# Patient Record
Sex: MALE | Race: WHITE | NOT HISPANIC OR LATINO | Employment: OTHER | URBAN - METROPOLITAN AREA
[De-identification: names, ages, dates, MRNs, and addresses within clinical notes are randomized per-mention and may not be internally consistent; named-entity substitution may affect disease eponyms.]

---

## 2018-02-01 ENCOUNTER — TELEPHONE (OUTPATIENT)
Dept: AUDIOLOGY | Facility: CLINIC | Age: 83
End: 2018-02-01

## 2018-02-01 NOTE — TELEPHONE ENCOUNTER
----- Message from Julianne Poe MA sent at 2/1/2018  2:38 PM CST -----  Contact: Pt's Wife Mrs Belcher [phone]      ----- Message -----  From: Sarah Skaggs  Sent: 2/1/2018  12:11 PM  To: Margaux DELANEY Staff    Pt's wife is requesting a call back from the nurse to schedule an appointment as a new patient.  Mrs Belcher reports that her  has a cochlear implant but is eligible for a new device but they need to establish with the provider here as they just moved from [place].    Pt has all the records from the previous surgery in [place].    Mrs Belcher may be reached at [phone].    Thank you.  LC

## 2018-03-05 ENCOUNTER — CLINICAL SUPPORT (OUTPATIENT)
Dept: AUDIOLOGY | Facility: CLINIC | Age: 83
End: 2018-03-05

## 2018-03-05 ENCOUNTER — CLINICAL SUPPORT (OUTPATIENT)
Dept: AUDIOLOGY | Facility: CLINIC | Age: 83
End: 2018-03-05
Payer: MEDICARE

## 2018-03-05 ENCOUNTER — OFFICE VISIT (OUTPATIENT)
Dept: OTOLARYNGOLOGY | Facility: CLINIC | Age: 83
End: 2018-03-05
Payer: MEDICARE

## 2018-03-05 VITALS — HEIGHT: 69 IN | WEIGHT: 185.88 LBS | BODY MASS INDEX: 27.53 KG/M2

## 2018-03-05 DIAGNOSIS — H91.8X3 ASYMMETRICAL HEARING LOSS, UNSPECIFIED LATERALITY: Primary | ICD-10-CM

## 2018-03-05 DIAGNOSIS — H90.A31 MIXED CONDUCTIVE AND SENSORINEURAL HEARING LOSS OF RIGHT EAR WITH RESTRICTED HEARING OF LEFT EAR: Primary | ICD-10-CM

## 2018-03-05 DIAGNOSIS — H71.92 CHOLESTEATOMA OF LEFT EAR: ICD-10-CM

## 2018-03-05 PROCEDURE — 92557 COMPREHENSIVE HEARING TEST: CPT | Mod: PBBFAC | Performed by: AUDIOLOGIST

## 2018-03-05 PROCEDURE — 99204 OFFICE O/P NEW MOD 45 MIN: CPT | Mod: 25,S$PBB,, | Performed by: OTOLARYNGOLOGY

## 2018-03-05 PROCEDURE — 99499 UNLISTED E&M SERVICE: CPT | Mod: S$GLB,,, | Performed by: OTOLARYNGOLOGY

## 2018-03-05 PROCEDURE — 69220 CLEAN OUT MASTOID CAVITY: CPT | Mod: PBBFAC | Performed by: OTOLARYNGOLOGY

## 2018-03-05 PROCEDURE — 99213 OFFICE O/P EST LOW 20 MIN: CPT | Mod: PBBFAC | Performed by: OTOLARYNGOLOGY

## 2018-03-05 PROCEDURE — 69220 CLEAN OUT MASTOID CAVITY: CPT | Mod: S$PBB,50,, | Performed by: OTOLARYNGOLOGY

## 2018-03-05 PROCEDURE — 99999 PR PBB SHADOW E&M-EST. PATIENT-LVL III: CPT | Mod: PBBFAC,,, | Performed by: OTOLARYNGOLOGY

## 2018-03-05 RX ORDER — WARFARIN 2 MG/1
2 TABLET ORAL DAILY
Status: ON HOLD | COMMUNITY
End: 2018-03-23 | Stop reason: HOSPADM

## 2018-03-05 RX ORDER — SIMVASTATIN 40 MG/1
40 TABLET, FILM COATED ORAL NIGHTLY
COMMUNITY

## 2018-03-05 RX ORDER — SITAGLIPTIN 25 MG/1
TABLET, FILM COATED ORAL
COMMUNITY
Start: 2018-02-12

## 2018-03-05 RX ORDER — GABAPENTIN 300 MG/1
CAPSULE ORAL
COMMUNITY
Start: 2018-02-24

## 2018-03-05 RX ORDER — LISINOPRIL 40 MG/1
TABLET ORAL
COMMUNITY
Start: 2018-01-09

## 2018-03-05 RX ORDER — LANOLIN ALCOHOL/MO/W.PET/CERES
1 CREAM (GRAM) TOPICAL 2 TIMES DAILY
COMMUNITY

## 2018-03-05 RX ORDER — MONTELUKAST SODIUM 10 MG/1
10 TABLET ORAL NIGHTLY
COMMUNITY

## 2018-03-05 RX ORDER — SILDENAFIL CITRATE 20 MG/1
TABLET ORAL
COMMUNITY
Start: 2018-01-09

## 2018-03-05 RX ORDER — POTASSIUM CHLORIDE 750 MG/1
10 TABLET, EXTENDED RELEASE ORAL 2 TIMES DAILY
COMMUNITY

## 2018-03-05 RX ORDER — ATENOLOL 25 MG/1
25 TABLET ORAL DAILY
COMMUNITY

## 2018-03-05 RX ORDER — AMLODIPINE BESYLATE 5 MG/1
TABLET ORAL
COMMUNITY
Start: 2018-01-09

## 2018-03-05 RX ORDER — TORSEMIDE 20 MG/1
20 TABLET ORAL DAILY
COMMUNITY

## 2018-03-05 RX ORDER — EZETIMIBE 10 MG/1
TABLET ORAL
COMMUNITY
Start: 2018-01-22

## 2018-03-05 RX ORDER — ROPINIROLE 0.5 MG/1
TABLET, FILM COATED ORAL
COMMUNITY
Start: 2018-02-05

## 2018-03-05 NOTE — PROGRESS NOTES
I dispensed Mr. Belcher's new left Baha 5 power sound processor.  I paired the TV streamer to the Baha and demonstrated its application.    He had an audiogram and saw Dr. Damico to establish care since they just moved from [place].    He will call if they have any problems.

## 2018-03-05 NOTE — PROGRESS NOTES
Subjective:       Patient ID: Luis Belcher is a 87 y.o. male.    Chief Complaint: B HL, L Cav and BAHA.    HPI: Hx of above.    Has L BAHA for ?? 6 yrs.    Needs F/U L Cav too.    Past Medical History: Patient has a past medical history of Arthritis; Cancer; Diabetes mellitus; Heart disease; and Hypertension.    Past Surgical History: Patient has no past surgical history on file.    Social History: Patient reports that he has quit smoking. He has never used smokeless tobacco.    Family History: family history is not on file.    Medications:   Current Outpatient Prescriptions   Medication Sig    amLODIPine (NORVASC) 5 MG tablet     atenolol (TENORMIN) 25 MG tablet Take 25 mg by mouth once daily.    calcium citrate-vitamin D3 315-200 mg (CITRACAL+D) 315-200 mg-unit per tablet Take 1 tablet by mouth 2 (two) times daily.    ezetimibe (ZETIA) 10 mg tablet     gabapentin (NEURONTIN) 300 MG capsule     JANUVIA 25 mg Tab     lisinopril (PRINIVIL,ZESTRIL) 40 MG tablet     montelukast (SINGULAIR) 10 mg tablet Take 10 mg by mouth every evening.    potassium chloride SA (K-DUR,KLOR-CON) 10 MEQ tablet Take 10 mEq by mouth 2 (two) times daily.    rOPINIRole (REQUIP) 0.5 MG tablet     sildenafil, antihypertensive, (REVATIO) 20 mg Tab     simvastatin (ZOCOR) 40 MG tablet Take 40 mg by mouth every evening.    torsemide (DEMADEX) 20 MG Tab Take 20 mg by mouth once daily.    UBIQUINONE ORAL Take by mouth.    warfarin (COUMADIN) 2 MG tablet Take 2 mg by mouth once daily.     No current facility-administered medications for this visit.        Allergies: Patient has No Known Allergies.      Review of Systems   Constitutional: Negative for activity change, appetite change, chills, diaphoresis, fatigue, fever and unexpected weight change.   HENT: Positive for ear discharge, ear pain and hearing loss. Negative for congestion, facial swelling, nosebleeds, postnasal drip, rhinorrhea, sinus pressure, sneezing, sore throat,  tinnitus, trouble swallowing and voice change.    Eyes: Negative for photophobia, pain, discharge, redness and visual disturbance.   Respiratory: Negative for cough, chest tightness, shortness of breath and wheezing.    Cardiovascular: Negative for chest pain and palpitations.   Gastrointestinal: Negative for abdominal pain, constipation, diarrhea and nausea.   Genitourinary: Negative for dysuria and frequency.   Musculoskeletal: Negative for arthralgias, back pain, gait problem, joint swelling, myalgias, neck pain and neck stiffness.   Skin: Negative for color change, pallor and rash.   Neurological: Negative for dizziness, tremors, seizures, syncope, facial asymmetry, speech difficulty, weakness, light-headedness, numbness and headaches.   Hematological: Negative for adenopathy. Does not bruise/bleed easily.   Psychiatric/Behavioral: Negative for agitation, confusion, decreased concentration, dysphoric mood and sleep disturbance. The patient is not nervous/anxious and is not hyperactive.        Objective:      Physical Exam   Constitutional: He is oriented to person, place, and time. He appears well-developed and well-nourished. He is cooperative.  Non-toxic appearance. He does not have a sickly appearance. He does not appear ill. No distress.   HENT:   Head: Normocephalic and atraumatic. Not macrocephalic and not microcephalic. Head is without raccoon's eyes, without Ureña's sign, without abrasion, without contusion, without laceration, without right periorbital erythema and without left periorbital erythema. Hair is normal.       Right Ear: Ear canal normal. No lacerations. No drainage, swelling or tenderness. No foreign bodies. No mastoid tenderness. Tympanic membrane is not injected, not scarred, not perforated, not erythematous, not retracted and not bulging. Tympanic membrane mobility is normal. No middle ear effusion. No hemotympanum. Decreased hearing is noted.   Left Ear: Ear canal normal. No  lacerations. No drainage, swelling or tenderness. No foreign bodies. No mastoid tenderness. Tympanic membrane is not injected, not scarred, not perforated, not erythematous, not retracted and not bulging. Tympanic membrane mobility is normal.  No middle ear effusion. No hemotympanum. Decreased hearing is noted.   Ears:    Nose: No mucosal edema, rhinorrhea, nose lacerations, sinus tenderness, nasal deformity, septal deviation or nasal septal hematoma. No epistaxis.  No foreign bodies. Right sinus exhibits no maxillary sinus tenderness. Left sinus exhibits no maxillary sinus tenderness.       Patient was brought to the minor procedure room and first the right then the left ear cavity was cleaned with microdissection techniques. There was no evidence of residual or recurrent cholesteatoma. Patient tolerated the procedure well.    Eyes: Conjunctivae, EOM and lids are normal. Pupils are equal, round, and reactive to light.   Neck: Normal range of motion. Neck supple. No JVD present. No tracheal tenderness and no muscular tenderness present. No neck rigidity. No tracheal deviation, no edema, no erythema and normal range of motion present. No thyroid mass and no thyromegaly present.   Cardiovascular: Normal rate and regular rhythm.    Pulmonary/Chest: Effort normal. No accessory muscle usage or stridor. No apnea, no tachypnea and no bradypnea. No respiratory distress.   Abdominal: Soft. Normal appearance.   Musculoskeletal: Normal range of motion.   Lymphadenopathy:        Head (right side): No submental, no submandibular, no tonsillar, no preauricular and no posterior auricular adenopathy present.        Head (left side): No submental, no submandibular, no tonsillar, no preauricular and no posterior auricular adenopathy present.     He has no cervical adenopathy.        Right cervical: No superficial cervical, no deep cervical and no posterior cervical adenopathy present.       Left cervical: No superficial cervical, no  deep cervical and no posterior cervical adenopathy present.   Neurological: He is alert and oriented to person, place, and time. He displays no atrophy and no tremor. No cranial nerve deficit or sensory deficit. He exhibits normal muscle tone. He displays no seizure activity.   Skin: Skin is warm, dry and intact. No abrasion, no bruising, no burn, no ecchymosis, no laceration, no lesion and no rash noted. He is not diaphoretic. No erythema. No pallor.   Psychiatric: He has a normal mood and affect. His behavior is normal. Judgment and thought content normal. His speech is not rapid and/or pressured and not slurred. Cognition and memory are normal. He is communicative.   Nursing note and vitals reviewed.              Assessment:       1. Asymmetrical hearing loss, unspecified laterality    2. Cholesteatoma of left ear        Plan:         RTC 6 mos.

## 2018-03-20 ENCOUNTER — HOSPITAL ENCOUNTER (INPATIENT)
Facility: HOSPITAL | Age: 83
LOS: 4 days | Discharge: HOME HEALTH | DRG: 314 | End: 2018-03-24
Attending: EMERGENCY MEDICINE | Admitting: EMERGENCY MEDICINE
Payer: MEDICARE

## 2018-03-20 DIAGNOSIS — T82.7XXD INFECTED AORTIC GRAFT, SUBSEQUENT ENCOUNTER: ICD-10-CM

## 2018-03-20 DIAGNOSIS — I77.6 AORTITIS: ICD-10-CM

## 2018-03-20 DIAGNOSIS — A41.9 SEPSIS, DUE TO UNSPECIFIED ORGANISM: ICD-10-CM

## 2018-03-20 DIAGNOSIS — I34.0 NONRHEUMATIC MITRAL VALVE INSUFFICIENCY: ICD-10-CM

## 2018-03-20 DIAGNOSIS — I71.40 ABDOMINAL AORTIC ANEURYSM (AAA) WITHOUT RUPTURE: ICD-10-CM

## 2018-03-20 DIAGNOSIS — A41.9 SEPSIS: Primary | ICD-10-CM

## 2018-03-20 DIAGNOSIS — I50.9 CHF (CONGESTIVE HEART FAILURE): ICD-10-CM

## 2018-03-20 DIAGNOSIS — I10 HYPERTENSION: ICD-10-CM

## 2018-03-20 PROBLEM — E11.29 TYPE 2 DIABETES MELLITUS WITH RENAL COMPLICATION: Status: ACTIVE | Noted: 2018-03-20

## 2018-03-20 PROBLEM — N18.30 STAGE 3 CHRONIC KIDNEY DISEASE: Status: ACTIVE | Noted: 2018-03-20

## 2018-03-20 PROBLEM — I48.0 PAROXYSMAL ATRIAL FIBRILLATION: Status: ACTIVE | Noted: 2018-03-20

## 2018-03-20 PROBLEM — D64.9 ANEMIA: Status: ACTIVE | Noted: 2018-03-20

## 2018-03-20 PROBLEM — C61 PROSTATE CANCER: Status: ACTIVE | Noted: 2018-03-20

## 2018-03-20 LAB
ALBUMIN SERPL BCP-MCNC: 3.1 G/DL
ALLENS TEST: ABNORMAL
ALLENS TEST: NORMAL
ALP SERPL-CCNC: 105 U/L
ALT SERPL W/O P-5'-P-CCNC: 14 U/L
ANION GAP SERPL CALC-SCNC: 14 MMOL/L
AST SERPL-CCNC: 18 U/L
BASOPHILS # BLD AUTO: 0.02 K/UL
BASOPHILS NFR BLD: 0.2 %
BILIRUB SERPL-MCNC: 0.8 MG/DL
BILIRUB UR QL STRIP: NEGATIVE
BUN SERPL-MCNC: 35 MG/DL
CALCIUM SERPL-MCNC: 9 MG/DL
CHLORIDE SERPL-SCNC: 98 MMOL/L
CLARITY UR REFRACT.AUTO: CLEAR
CO2 SERPL-SCNC: 28 MMOL/L
COLOR UR AUTO: YELLOW
CREAT SERPL-MCNC: 1.7 MG/DL
CRP SERPL-MCNC: 54.88 MG/L
DIFFERENTIAL METHOD: ABNORMAL
EOSINOPHIL # BLD AUTO: 0.1 K/UL
EOSINOPHIL NFR BLD: 0.4 %
ERYTHROCYTE [DISTWIDTH] IN BLOOD BY AUTOMATED COUNT: 15.2 %
ERYTHROCYTE [SEDIMENTATION RATE] IN BLOOD BY WESTERGREN METHOD: 80 MM/HR
EST. GFR  (AFRICAN AMERICAN): 41 ML/MIN/1.73 M^2
EST. GFR  (NON AFRICAN AMERICAN): 35.5 ML/MIN/1.73 M^2
GLUCOSE SERPL-MCNC: 236 MG/DL
GLUCOSE UR QL STRIP: NEGATIVE
HCO3 UR-SCNC: 32.7 MMOL/L (ref 24–28)
HCT VFR BLD AUTO: 29.2 %
HGB BLD-MCNC: 9.2 G/DL
HGB UR QL STRIP: NEGATIVE
IMM GRANULOCYTES # BLD AUTO: 0.1 K/UL
IMM GRANULOCYTES NFR BLD AUTO: 0.8 %
KETONES UR QL STRIP: NEGATIVE
LACTATE SERPL-SCNC: 1.4 MMOL/L
LACTATE SERPL-SCNC: 2.4 MMOL/L
LDH SERPL L TO P-CCNC: 1.17 MMOL/L (ref 0.5–2.2)
LDH SERPL L TO P-CCNC: 1.39 MMOL/L (ref 0.5–2.2)
LEUKOCYTE ESTERASE UR QL STRIP: NEGATIVE
LIPASE SERPL-CCNC: 35 U/L
LYMPHOCYTES # BLD AUTO: 0.8 K/UL
LYMPHOCYTES NFR BLD: 5.8 %
MCH RBC QN AUTO: 28.2 PG
MCHC RBC AUTO-ENTMCNC: 31.5 G/DL
MCV RBC AUTO: 90 FL
MONOCYTES # BLD AUTO: 0.5 K/UL
MONOCYTES NFR BLD: 3.7 %
NEUTROPHILS # BLD AUTO: 11.6 K/UL
NEUTROPHILS NFR BLD: 89.1 %
NITRITE UR QL STRIP: NEGATIVE
NRBC BLD-RTO: 0 /100 WBC
PCO2 BLDA: 44.5 MMHG (ref 35–45)
PH SMN: 7.47 [PH] (ref 7.35–7.45)
PH UR STRIP: 5 [PH] (ref 5–8)
PLATELET # BLD AUTO: 223 K/UL
PMV BLD AUTO: 10.7 FL
PO2 BLDA: 44 MMHG (ref 40–60)
POC BE: 9 MMOL/L
POC SATURATED O2: 83 % (ref 95–100)
POC TCO2: 34 MMOL/L (ref 24–29)
POTASSIUM SERPL-SCNC: 3.6 MMOL/L
PROT SERPL-MCNC: 7.5 G/DL
PROT UR QL STRIP: NEGATIVE
RBC # BLD AUTO: 3.26 M/UL
SAMPLE: ABNORMAL
SAMPLE: NORMAL
SITE: ABNORMAL
SITE: NORMAL
SODIUM SERPL-SCNC: 140 MMOL/L
SP GR UR STRIP: 1.01 (ref 1–1.03)
URN SPEC COLLECT METH UR: NORMAL
UROBILINOGEN UR STRIP-ACNC: NEGATIVE EU/DL
WBC # BLD AUTO: 13 K/UL

## 2018-03-20 PROCEDURE — 83605 ASSAY OF LACTIC ACID: CPT

## 2018-03-20 PROCEDURE — 99900035 HC TECH TIME PER 15 MIN (STAT)

## 2018-03-20 PROCEDURE — 86141 C-REACTIVE PROTEIN HS: CPT

## 2018-03-20 PROCEDURE — 96367 TX/PROPH/DG ADDL SEQ IV INF: CPT

## 2018-03-20 PROCEDURE — 25000003 PHARM REV CODE 250: Performed by: STUDENT IN AN ORGANIZED HEALTH CARE EDUCATION/TRAINING PROGRAM

## 2018-03-20 PROCEDURE — 85025 COMPLETE CBC W/AUTO DIFF WBC: CPT

## 2018-03-20 PROCEDURE — 96365 THER/PROPH/DIAG IV INF INIT: CPT

## 2018-03-20 PROCEDURE — 82962 GLUCOSE BLOOD TEST: CPT

## 2018-03-20 PROCEDURE — 63600175 PHARM REV CODE 636 W HCPCS: Performed by: STUDENT IN AN ORGANIZED HEALTH CARE EDUCATION/TRAINING PROGRAM

## 2018-03-20 PROCEDURE — 82803 BLOOD GASES ANY COMBINATION: CPT

## 2018-03-20 PROCEDURE — 96366 THER/PROPH/DIAG IV INF ADDON: CPT

## 2018-03-20 PROCEDURE — 80053 COMPREHEN METABOLIC PANEL: CPT

## 2018-03-20 PROCEDURE — 85651 RBC SED RATE NONAUTOMATED: CPT

## 2018-03-20 PROCEDURE — 93005 ELECTROCARDIOGRAM TRACING: CPT

## 2018-03-20 PROCEDURE — 99284 EMERGENCY DEPT VISIT MOD MDM: CPT | Mod: ,,, | Performed by: EMERGENCY MEDICINE

## 2018-03-20 PROCEDURE — 87040 BLOOD CULTURE FOR BACTERIA: CPT

## 2018-03-20 PROCEDURE — 81003 URINALYSIS AUTO W/O SCOPE: CPT

## 2018-03-20 PROCEDURE — 99285 EMERGENCY DEPT VISIT HI MDM: CPT | Mod: 25

## 2018-03-20 PROCEDURE — 93010 ELECTROCARDIOGRAM REPORT: CPT | Mod: ,,, | Performed by: INTERNAL MEDICINE

## 2018-03-20 PROCEDURE — 83690 ASSAY OF LIPASE: CPT

## 2018-03-20 PROCEDURE — 12000002 HC ACUTE/MED SURGE SEMI-PRIVATE ROOM

## 2018-03-20 RX ORDER — IBUPROFEN 200 MG
16 TABLET ORAL
Status: DISCONTINUED | OUTPATIENT
Start: 2018-03-21 | End: 2018-03-24 | Stop reason: HOSPADM

## 2018-03-20 RX ORDER — IBUPROFEN 600 MG/1
600 TABLET ORAL
Status: DISCONTINUED | OUTPATIENT
Start: 2018-03-20 | End: 2018-03-21

## 2018-03-20 RX ORDER — ONDANSETRON 2 MG/ML
4 INJECTION INTRAMUSCULAR; INTRAVENOUS EVERY 8 HOURS PRN
Status: DISCONTINUED | OUTPATIENT
Start: 2018-03-21 | End: 2018-03-24 | Stop reason: HOSPADM

## 2018-03-20 RX ORDER — INSULIN ASPART 100 [IU]/ML
0-5 INJECTION, SOLUTION INTRAVENOUS; SUBCUTANEOUS
Status: DISCONTINUED | OUTPATIENT
Start: 2018-03-21 | End: 2018-03-24 | Stop reason: HOSPADM

## 2018-03-20 RX ORDER — TORSEMIDE 20 MG/1
20 TABLET ORAL DAILY
Status: DISCONTINUED | OUTPATIENT
Start: 2018-03-21 | End: 2018-03-21

## 2018-03-20 RX ORDER — EZETIMIBE 10 MG/1
10 TABLET ORAL DAILY
Status: DISCONTINUED | OUTPATIENT
Start: 2018-03-21 | End: 2018-03-24 | Stop reason: HOSPADM

## 2018-03-20 RX ORDER — POTASSIUM CHLORIDE 750 MG/1
10 TABLET, EXTENDED RELEASE ORAL 2 TIMES DAILY
Status: DISCONTINUED | OUTPATIENT
Start: 2018-03-21 | End: 2018-03-20

## 2018-03-20 RX ORDER — POTASSIUM CHLORIDE 750 MG/1
10 CAPSULE, EXTENDED RELEASE ORAL 2 TIMES DAILY
Status: DISCONTINUED | OUTPATIENT
Start: 2018-03-21 | End: 2018-03-24 | Stop reason: HOSPADM

## 2018-03-20 RX ORDER — SODIUM CHLORIDE 0.9 % (FLUSH) 0.9 %
5 SYRINGE (ML) INJECTION
Status: DISCONTINUED | OUTPATIENT
Start: 2018-03-21 | End: 2018-03-24 | Stop reason: HOSPADM

## 2018-03-20 RX ORDER — IBUPROFEN 200 MG
24 TABLET ORAL
Status: DISCONTINUED | OUTPATIENT
Start: 2018-03-21 | End: 2018-03-24 | Stop reason: HOSPADM

## 2018-03-20 RX ORDER — ONDANSETRON 8 MG/1
8 TABLET, ORALLY DISINTEGRATING ORAL EVERY 8 HOURS PRN
Status: DISCONTINUED | OUTPATIENT
Start: 2018-03-21 | End: 2018-03-24 | Stop reason: HOSPADM

## 2018-03-20 RX ORDER — RAMELTEON 8 MG/1
8 TABLET ORAL NIGHTLY PRN
Status: DISCONTINUED | OUTPATIENT
Start: 2018-03-21 | End: 2018-03-24 | Stop reason: HOSPADM

## 2018-03-20 RX ORDER — MONTELUKAST SODIUM 10 MG/1
10 TABLET ORAL NIGHTLY
Status: DISCONTINUED | OUTPATIENT
Start: 2018-03-21 | End: 2018-03-24 | Stop reason: HOSPADM

## 2018-03-20 RX ORDER — SIMVASTATIN 40 MG/1
40 TABLET, FILM COATED ORAL NIGHTLY
Status: DISCONTINUED | OUTPATIENT
Start: 2018-03-21 | End: 2018-03-24 | Stop reason: HOSPADM

## 2018-03-20 RX ORDER — ROPINIROLE 0.5 MG/1
0.5 TABLET, FILM COATED ORAL NIGHTLY
Status: DISCONTINUED | OUTPATIENT
Start: 2018-03-21 | End: 2018-03-24 | Stop reason: HOSPADM

## 2018-03-20 RX ORDER — ACETAMINOPHEN 325 MG/1
650 TABLET ORAL EVERY 8 HOURS PRN
Status: DISCONTINUED | OUTPATIENT
Start: 2018-03-21 | End: 2018-03-24 | Stop reason: HOSPADM

## 2018-03-20 RX ORDER — SILDENAFIL CITRATE 20 MG/1
20 TABLET ORAL 3 TIMES DAILY
Status: DISCONTINUED | OUTPATIENT
Start: 2018-03-21 | End: 2018-03-21

## 2018-03-20 RX ORDER — GLUCAGON 1 MG
1 KIT INJECTION
Status: DISCONTINUED | OUTPATIENT
Start: 2018-03-21 | End: 2018-03-24 | Stop reason: HOSPADM

## 2018-03-20 RX ORDER — ACETAMINOPHEN 500 MG
1000 TABLET ORAL
Status: COMPLETED | OUTPATIENT
Start: 2018-03-20 | End: 2018-03-20

## 2018-03-20 RX ORDER — GABAPENTIN 300 MG/1
300 CAPSULE ORAL NIGHTLY
Status: DISCONTINUED | OUTPATIENT
Start: 2018-03-21 | End: 2018-03-24 | Stop reason: HOSPADM

## 2018-03-20 RX ORDER — VANCOMYCIN 2 GRAM/500 ML IN 0.9 % SODIUM CHLORIDE INTRAVENOUS
2000
Status: COMPLETED | OUTPATIENT
Start: 2018-03-20 | End: 2018-03-20

## 2018-03-20 RX ADMIN — PIPERACILLIN AND TAZOBACTAM 4.5 G: 4; .5 INJECTION, POWDER, LYOPHILIZED, FOR SOLUTION INTRAVENOUS; PARENTERAL at 07:03

## 2018-03-20 RX ADMIN — SODIUM CHLORIDE 2517 ML: 0.9 INJECTION, SOLUTION INTRAVENOUS at 07:03

## 2018-03-20 RX ADMIN — VANCOMYCIN HYDROCHLORIDE 2000 MG: 10 INJECTION, POWDER, LYOPHILIZED, FOR SOLUTION INTRAVENOUS at 08:03

## 2018-03-20 RX ADMIN — ACETAMINOPHEN 1000 MG: 500 TABLET ORAL at 08:03

## 2018-03-20 NOTE — ED TRIAGE NOTES
Sudden onset of headache, fever and chills that began today. Pt has a history of theses symptoms and was discharged last Sunday.

## 2018-03-21 PROBLEM — G47.30 SLEEP APNEA: Status: ACTIVE | Noted: 2018-03-21

## 2018-03-21 PROBLEM — I48.20 CHRONIC ATRIAL FIBRILLATION: Status: ACTIVE | Noted: 2018-03-20

## 2018-03-21 LAB
ALBUMIN SERPL BCP-MCNC: 2.6 G/DL
ALP SERPL-CCNC: 80 U/L
ALT SERPL W/O P-5'-P-CCNC: 12 U/L
ANION GAP SERPL CALC-SCNC: 8 MMOL/L
AORTIC VALVE REGURGITATION: ABNORMAL
AST SERPL-CCNC: 16 U/L
BASOPHILS # BLD AUTO: 0.03 K/UL
BASOPHILS NFR BLD: 0.2 %
BILIRUB SERPL-MCNC: 0.7 MG/DL
BUN SERPL-MCNC: 28 MG/DL
CALCIUM SERPL-MCNC: 8.4 MG/DL
CHLORIDE SERPL-SCNC: 105 MMOL/L
CO2 SERPL-SCNC: 29 MMOL/L
CREAT SERPL-MCNC: 1.6 MG/DL
DIASTOLIC DYSFUNCTION: YES
DIFFERENTIAL METHOD: ABNORMAL
EOSINOPHIL # BLD AUTO: 0 K/UL
EOSINOPHIL NFR BLD: 0.2 %
ERYTHROCYTE [DISTWIDTH] IN BLOOD BY AUTOMATED COUNT: 15.6 %
EST. GFR  (AFRICAN AMERICAN): 44.1 ML/MIN/1.73 M^2
EST. GFR  (NON AFRICAN AMERICAN): 38.2 ML/MIN/1.73 M^2
ESTIMATED AVG GLUCOSE: 154 MG/DL
ESTIMATED PA SYSTOLIC PRESSURE: 63.72
FOLATE SERPL-MCNC: 8.4 NG/ML
GLUCOSE SERPL-MCNC: 115 MG/DL
HBA1C MFR BLD HPLC: 7 %
HCT VFR BLD AUTO: 25 %
HGB BLD-MCNC: 7.7 G/DL
IMM GRANULOCYTES # BLD AUTO: 0.1 K/UL
IMM GRANULOCYTES NFR BLD AUTO: 0.7 %
IRON SERPL-MCNC: 14 UG/DL
LYMPHOCYTES # BLD AUTO: 1.3 K/UL
LYMPHOCYTES NFR BLD: 9 %
MAGNESIUM SERPL-MCNC: 1.8 MG/DL
MCH RBC QN AUTO: 28.4 PG
MCHC RBC AUTO-ENTMCNC: 30.8 G/DL
MCV RBC AUTO: 92 FL
MITRAL VALVE REGURGITATION: ABNORMAL
MONOCYTES # BLD AUTO: 1.5 K/UL
MONOCYTES NFR BLD: 10.2 %
NEUTROPHILS # BLD AUTO: 11.9 K/UL
NEUTROPHILS NFR BLD: 79.7 %
NRBC BLD-RTO: 0 /100 WBC
PHOSPHATE SERPL-MCNC: 3.6 MG/DL
PLATELET # BLD AUTO: 187 K/UL
PMV BLD AUTO: 11.2 FL
POCT GLUCOSE: 144 MG/DL (ref 70–110)
POCT GLUCOSE: 153 MG/DL (ref 70–110)
POCT GLUCOSE: 94 MG/DL (ref 70–110)
POCT GLUCOSE: 99 MG/DL (ref 70–110)
POTASSIUM SERPL-SCNC: 3.5 MMOL/L
PROT SERPL-MCNC: 6.4 G/DL
RBC # BLD AUTO: 2.71 M/UL
RETIRED EF AND QEF - SEE NOTES: 55 (ref 55–65)
SATURATED IRON: 6 %
SODIUM SERPL-SCNC: 142 MMOL/L
TOTAL IRON BINDING CAPACITY: 222 UG/DL
TRANSFERRIN SERPL-MCNC: 150 MG/DL
TRICUSPID VALVE REGURGITATION: ABNORMAL
VIT B12 SERPL-MCNC: 538 PG/ML
WBC # BLD AUTO: 14.85 K/UL

## 2018-03-21 PROCEDURE — 99900035 HC TECH TIME PER 15 MIN (STAT)

## 2018-03-21 PROCEDURE — 63600175 PHARM REV CODE 636 W HCPCS: Performed by: STUDENT IN AN ORGANIZED HEALTH CARE EDUCATION/TRAINING PROGRAM

## 2018-03-21 PROCEDURE — 27000190 HC CPAP FULL FACE MASK W/VALVE

## 2018-03-21 PROCEDURE — 99223 1ST HOSP IP/OBS HIGH 75: CPT | Mod: AI,GC,, | Performed by: HOSPITALIST

## 2018-03-21 PROCEDURE — 27000221 HC OXYGEN, UP TO 24 HOURS

## 2018-03-21 PROCEDURE — 94660 CPAP INITIATION&MGMT: CPT

## 2018-03-21 PROCEDURE — 80053 COMPREHEN METABOLIC PANEL: CPT

## 2018-03-21 PROCEDURE — 11000001 HC ACUTE MED/SURG PRIVATE ROOM

## 2018-03-21 PROCEDURE — 97165 OT EVAL LOW COMPLEX 30 MIN: CPT

## 2018-03-21 PROCEDURE — G8987 SELF CARE CURRENT STATUS: HCPCS | Mod: CK

## 2018-03-21 PROCEDURE — 83036 HEMOGLOBIN GLYCOSYLATED A1C: CPT

## 2018-03-21 PROCEDURE — 25000003 PHARM REV CODE 250: Performed by: INTERNAL MEDICINE

## 2018-03-21 PROCEDURE — 86592 SYPHILIS TEST NON-TREP QUAL: CPT

## 2018-03-21 PROCEDURE — 93306 TTE W/DOPPLER COMPLETE: CPT

## 2018-03-21 PROCEDURE — 94761 N-INVAS EAR/PLS OXIMETRY MLT: CPT

## 2018-03-21 PROCEDURE — 84100 ASSAY OF PHOSPHORUS: CPT

## 2018-03-21 PROCEDURE — 82607 VITAMIN B-12: CPT

## 2018-03-21 PROCEDURE — 99223 1ST HOSP IP/OBS HIGH 75: CPT | Mod: ,,, | Performed by: INTERNAL MEDICINE

## 2018-03-21 PROCEDURE — G8988 SELF CARE GOAL STATUS: HCPCS | Mod: CK

## 2018-03-21 PROCEDURE — 83540 ASSAY OF IRON: CPT

## 2018-03-21 PROCEDURE — 25500020 PHARM REV CODE 255: Performed by: HOSPITALIST

## 2018-03-21 PROCEDURE — G8989 SELF CARE D/C STATUS: HCPCS | Mod: CK

## 2018-03-21 PROCEDURE — 25000003 PHARM REV CODE 250: Performed by: STUDENT IN AN ORGANIZED HEALTH CARE EDUCATION/TRAINING PROGRAM

## 2018-03-21 PROCEDURE — 83735 ASSAY OF MAGNESIUM: CPT

## 2018-03-21 PROCEDURE — 82746 ASSAY OF FOLIC ACID SERUM: CPT

## 2018-03-21 PROCEDURE — 36415 COLL VENOUS BLD VENIPUNCTURE: CPT

## 2018-03-21 PROCEDURE — 93306 TTE W/DOPPLER COMPLETE: CPT | Mod: 26,,, | Performed by: INTERNAL MEDICINE

## 2018-03-21 PROCEDURE — 85025 COMPLETE CBC W/AUTO DIFF WBC: CPT

## 2018-03-21 RX ORDER — CALCIUM CARBONATE 200(500)MG
500 TABLET,CHEWABLE ORAL 3 TIMES DAILY PRN
Status: DISCONTINUED | OUTPATIENT
Start: 2018-03-21 | End: 2018-03-24 | Stop reason: HOSPADM

## 2018-03-21 RX ORDER — POLYETHYLENE GLYCOL 3350 17 G/17G
17 POWDER, FOR SOLUTION ORAL 2 TIMES DAILY PRN
Status: DISCONTINUED | OUTPATIENT
Start: 2018-03-21 | End: 2018-03-23

## 2018-03-21 RX ADMIN — APIXABAN 2.5 MG: 2.5 TABLET, FILM COATED ORAL at 09:03

## 2018-03-21 RX ADMIN — SIMVASTATIN 40 MG: 40 TABLET, FILM COATED ORAL at 09:03

## 2018-03-21 RX ADMIN — GABAPENTIN 300 MG: 300 CAPSULE ORAL at 09:03

## 2018-03-21 RX ADMIN — ROPINIROLE HYDROCHLORIDE 0.5 MG: 0.5 TABLET, FILM COATED ORAL at 09:03

## 2018-03-21 RX ADMIN — Medication 1250 MG: at 09:03

## 2018-03-21 RX ADMIN — MONTELUKAST SODIUM 10 MG: 10 TABLET, FILM COATED ORAL at 09:03

## 2018-03-21 RX ADMIN — CEFTRIAXONE SODIUM 2 G: 2 INJECTION, POWDER, FOR SOLUTION INTRAMUSCULAR; INTRAVENOUS at 06:03

## 2018-03-21 RX ADMIN — IOHEXOL 100 ML: 350 INJECTION, SOLUTION INTRAVENOUS at 01:03

## 2018-03-21 RX ADMIN — POTASSIUM CHLORIDE 10 MEQ: 750 CAPSULE, EXTENDED RELEASE ORAL at 09:03

## 2018-03-21 NOTE — CONSULTS
[facility]  Infectious Disease  Consult Note    Patient Name: Luis Belcher  MRN: [id number]  Admission Date: 3/20/2018  Hospital Length of Stay: 1 days  Attending Physician: Kasie Osborne MD  Primary Care Provider: Laura Arrieta DO     Isolation Status: No active isolations    Inpatient consult to Infectious Diseases  Consult performed by: KATRINA ORELLANA  Consult ordered by: VI SWEET  Reason for consult: sepsis, recurrent fevers x 7 years, questionable hx of infected AAA        ID Consult acknowledged.   Full consult and recommendations to follow today.   In the interim, please call for any immediate concerns.     Thank you.  KANA Simental, ANP-C  [phone] pager,  [phone]

## 2018-03-21 NOTE — CONSULTS
[facility]  Infectious Disease  Consult Note    Patient Name: Luis Belcher  MRN: [id number]  Admission Date: 3/20/2018  Hospital Length of Stay: 1 days  Attending Physician: Kasie Osborne MD  Primary Care Provider: Laura Arrieta DO     Isolation Status: No active isolations    Patient information was obtained from patient, spouse/SO and ER records.      Consults  Assessment/Plan:     * Sepsis       87 year old male with HTN, DM 2, CHF (unknown type or EF ), AAA s/p endovascular repair in 2001, atrial fibrillation, CKD stage 3 , and prostate cancer s/p radical prostatectomy 20 years ago admitted with fevers (up to 103), chills, nausea.   Reported long history of IV antibiotics and  antibiotic suppression for recurrent fevers X approx. 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  Previously followed by ID in [place]. Reportedly told he had a pocket of infection by the graft and was not a surgical candidate.   Recently completed 6 week course of antibiotics in December prior to coming to [place].  He had recent admission to [facility] on 3/6/18 3/06/18 for fever, chills, n/v, weakness, and shortness of breath.  He was apparently discharged home on ciprofloxacin and linezolid.      CT chest/abd here is concerning for aortitis involving the infrarenal abd aorta at the level of the graft bifurcation to the common iliacs and possible abscess 2.3 X 2.0 anterior to the left common iliac graft.   Per Primary Team, Vascular Surgery has reviewed and advised he is not a surgical candidate.     Blood cultures 3/20 NGTD.  2D echo negative for vegetation.   CRP 54.8  WBC 14.85     He is currently on empiric vancomycin and ceftriaxone.  Afebrile, hemodynamically stable.       -  Continue IV vancomycin and ceftriaxone for now.   -  Vancomycin trough before 3rd dose.    -  Will follow blood cultures and adjust abx accordingly   -  Will get prior blood culture results from [facility].    -  Review [place] ID  "physician records - Authorization sent to Dr. Azul Shoemaker in [place].     -  ESR, CRP and procalcitonin tomorrow a.m.    -  Will follow up tomorrow with further recommendations.  If no growth on cultures, will likely recommend continued ciprofloxacin and zyvox, though will review  cultures and [place] records.     Patient seen by, and plan discussed with, ID staff  Discussed with Primary Team               Thank you for your consult. I will follow-up with patient. Please contact us if you have any additional questions.    Thank you.   Please call for any questions or concerns.  Alexandra Melendez, APRN, ANP-C  [phone]    Subjective:     Principal Problem: Sepsis    HPI:   Mr. Belcher is an 87 year old male with HTN, DM 2, CHF (unknown type or EF ), AAA s/p endovascular repair in 2001, atrial fibrillation, CKD stage 3 , and prostate cancer s/p radical prostatectomy 20 years ago. He is being admitted for fevers, chills, n/v.  He recently moved from [place] to [place].  Per H&P he has a long history of recurrent fevers, which first occurred about 7 years ago. He has been on and off suppressive therapy with antibiotics over this course, and most recently completed a six week course of antibiotics on 12/18/17.   Family does not know which antibiotic he was on. Patient's wife reports he was followed by an infectious disease doctor in [place], and his infection stemmed from "a pocket of infection by his aorta", and that he was not a surgical candidate.       Most recently, Mr. Belcher was admitted to [facility] on 3/06/18 for fever, chills, n/v, weakness, and shortness of breath. The paperwork they provide is limited, but his discharge diagnoses included CHF exacerbation, elevated lactic acid, elevated troponin, and afib. He was discharged home on a course of ciprofloxacin 250mg bid and linezolid 600mg bid (uncertain length of course). Wife reports compliance with medications.      On 3/20, Mr. Belcher presented " "to the ED with sudden onsetfever of 103 F, weakness, chills,  nausea, and two episodes of nonbloody emesis. He also complained of right-sided temporal headache, which he described as throbbing, but has since resolved. He was treated in the ED with a 30cc/kg bolus, acetaminophen, ibuprofen, and IV vancomycin and piperacillin/tazobactam.  ID is consulted for recommendations.       Blood cultures 3/20  NGTD    CTA chest/Abdomen - c/f aortitis involving the infrarenal abd aorta at the level of the graft bifurcation to the common iliacs, graft involvement cannot be excluded.  Possible abscess 2.3 X 2.0 anterior to the left common iliac graft.     2D Echo - negative for vegetation     WBC 14.85  CRP 54.88      Patient reports he follows with Dr. Sorto at  Infectious Disease  ID doctor in [place] - Dr. Azul Shoemaker     At time of visit patient is afebrile and reports he "feels great".  Denies chills, sweats.  No further n/v.  Very hungry. Denies any pain.  Recent antibiotic history unclear.  Per telephone call with wife he had two weeks of IV antibiotics as an inpatient at , followed by orals?   No culture data as yet from  or [place]    Past Medical History:   Diagnosis Date    AAA (abdominal aortic aneurysm)     Arthritis     Bloodstream infection     Cancer     prostste    Diabetes mellitus     Heart disease     Hypertension        Past Surgical History:   Procedure Laterality Date    ABDOMINAL AORTIC ANEURYSM REPAIR      COCHLEAR IMPLANT REVISION      PROSTATECTOMY         Review of patient's allergies indicates:  No Known Allergies    Medications:  Prescriptions Prior to Admission   Medication Sig    apixaban (ELIQUIS) 2.5 mg Tab Take by mouth 2 (two) times daily.    ezetimibe (ZETIA) 10 mg tablet     gabapentin (NEURONTIN) 300 MG capsule     montelukast (SINGULAIR) 10 mg tablet Take 10 mg by mouth every evening.    potassium chloride SA (K-DUR,KLOR-CON) 10 MEQ tablet Take 10 mEq by mouth 2 " (two) times daily.    rOPINIRole (REQUIP) 0.5 MG tablet     sildenafil, antihypertensive, (REVATIO) 20 mg Tab     simvastatin (ZOCOR) 40 MG tablet Take 40 mg by mouth every evening.    torsemide (DEMADEX) 20 MG Tab Take 20 mg by mouth once daily.    UBIQUINONE ORAL Take by mouth.    amLODIPine (NORVASC) 5 MG tablet     atenolol (TENORMIN) 25 MG tablet Take 25 mg by mouth once daily.    calcium citrate-vitamin D3 315-200 mg (CITRACAL+D) 315-200 mg-unit per tablet Take 1 tablet by mouth 2 (two) times daily.    JANUVIA 25 mg Tab     lisinopril (PRINIVIL,ZESTRIL) 40 MG tablet     warfarin (COUMADIN) 2 MG tablet Take 2 mg by mouth once daily.     Antibiotics     Start     Stop Route Frequency Ordered    03/21/18 2100  vancomycin (VANCOCIN) 1,250 mg in sodium chloride 0.9% 250 mL IVPB  (Vancomycin IVPB with levels panel)      -- IV Every 24 hours (non-standard times) 03/21/18 0025    03/21/18 0700  cefTRIAXone (ROCEPHIN) 2 g in dextrose 5 % 50 mL IVPB      -- IV Every 24 hours (non-standard times) 03/21/18 0025        Antifungals     None        Antivirals     None             There is no immunization history on file for this patient.    Family History     None        Social History     Social History    Marital status:      Spouse name: N/A    Number of children: N/A    Years of education: N/A     Social History Main Topics    Smoking status: Former Smoker    Smokeless tobacco: Never Used    Alcohol use No    Drug use: Unknown    Sexual activity: Not Asked     Other Topics Concern    None     Social History Narrative    None     Review of Systems   Constitutional: Positive for chills (resolved) and fever (resolved). Negative for activity change, appetite change, fatigue and unexpected weight change.   HENT: Negative for ear pain and rhinorrhea.    Eyes: Negative for pain and redness.   Respiratory: Negative for cough, shortness of breath and wheezing.    Cardiovascular: Negative for chest  pain, palpitations and leg swelling.   Gastrointestinal: Positive for nausea (on admission - resolved) and vomiting (on admission - resolved). Negative for abdominal pain, constipation and diarrhea.   Endocrine: Negative for polydipsia and polyuria.   Genitourinary: Negative for dysuria and hematuria.   Musculoskeletal: Negative for back pain and neck pain.   Skin: Negative for color change and rash.   Neurological: Negative for light-headedness and headaches.   Hematological: Negative for adenopathy. Does not bruise/bleed easily.   Psychiatric/Behavioral: Negative for confusion. The patient is not nervous/anxious.      Objective:     Vital Signs (Most Recent):  Temp: 97 °F (36.1 °C) (03/21/18 1250)  Pulse: 60 (03/21/18 1250)  Resp: 18 (03/21/18 1250)  BP: (!) 142/63 (03/21/18 1250)  SpO2: 98 % (03/21/18 1250) Vital Signs (24h Range):  Temp:  [96.8 °F (36 °C)-103.1 °F (39.5 °C)] 97 °F (36.1 °C)  Pulse:  [] 60  Resp:  [16-27] 18  SpO2:  [83 %-99 %] 98 %  BP: (101-151)/(50-74) 142/63     Weight: 83.9 kg (185 lb)  Body mass index is 28.98 kg/m².    Estimated Creatinine Clearance: 33.7 mL/min (A) (based on SCr of 1.6 mg/dL (H)).    Physical Exam   Constitutional: He is oriented to person, place, and time. He appears well-developed and well-nourished. No distress.   HENT:   Head: Normocephalic and atraumatic.   Eyes: Conjunctivae and EOM are normal. No scleral icterus.   Neck: Normal range of motion. Neck supple.   Cardiovascular: Normal rate and normal heart sounds.  An irregularly irregular rhythm present. Exam reveals no friction rub.    No murmur heard.  Pulmonary/Chest: Effort normal and breath sounds normal. No respiratory distress. He has no wheezes. He has no rales.   Abdominal: Soft. Bowel sounds are normal. He exhibits no distension. There is no tenderness. There is no guarding.   Soft, ventral hernia     Musculoskeletal: Normal range of motion. He exhibits no edema or tenderness.   Neurological: He is  alert and oriented to person, place, and time.   Skin: Skin is warm and dry. No rash noted. He is not diaphoretic.   Psychiatric: He has a normal mood and affect. His behavior is normal.       Significant Labs:   Blood Culture:   Recent Labs  Lab 03/20/18 1938   LABBLOO No Growth to date  No Growth to date     CBC:   Recent Labs  Lab 03/20/18 1923 03/21/18  0536   WBC 13.00* 14.85*   HGB 9.2* 7.7*   HCT 29.2* 25.0*    187     CMP:   Recent Labs  Lab 03/20/18 1923 03/21/18  0536    142   K 3.6 3.5   CL 98 105   CO2 28 29   * 115*   BUN 35* 28*   CREATININE 1.7* 1.6*   CALCIUM 9.0 8.4*   PROT 7.5 6.4   ALBUMIN 3.1* 2.6*   BILITOT 0.8 0.7   ALKPHOS 105 80   AST 18 16   ALT 14 12   ANIONGAP 14 8   EGFRNONAA 35.5* 38.2*     Procalcitonin: No results for input(s): PROCAL in the last 48 hours.    Significant Imaging: I have reviewed all pertinent imaging results/findings within the past 24 hours.

## 2018-03-21 NOTE — ED NOTES
Patient's family refusing imaging at this time. They want to wait to have these tests completed at [facility].

## 2018-03-21 NOTE — ED NOTES
Pt resting in bed. No acute distress noted. Respirations even and unlabored. Remains on 2 liters of O2 per nasal cannula. Remains on cardiac monitor with continuous pulse ox and BP rounding. No new complaints voiced. Updated on plan of care. Family at bedside. Side rails up x2. Call light within reach. Will continue to monitor.

## 2018-03-21 NOTE — ASSESSMENT & PLAN NOTE
No diabetes medications on home med list, however used to take Januvia  - POCT glucose ac+hs  - SSI  - diabetic diet  - hypoglycemic protocol

## 2018-03-21 NOTE — ED NOTES
Patient's O2 SATS in low 80s on RA. Patient placed on 2 liters of O2 per nasal cannula. O2 SATS now 96% on 2 liters. Patient sitting up on stretcher speaking with MD at bedside. Wife also at bedside providing history. Patient Awake and Alert. NAD noted. Respirations even and unlabored. Patient remains on cardiac monitor with continuous pulse ox and BP rounding. Will continue to monitor..

## 2018-03-21 NOTE — PT/OT/SLP EVAL
Occupational Therapy   Evaluation and Discharge Note    Name: Luis Belcher  MRN: [id number]  Admitting Diagnosis:  Sepsis      Recommendations:     Discharge Recommendations: home health OT  Discharge Equipment Recommendations:  none  Barriers to discharge:  Decreased caregiver support (elderly wife)    History:     Occupational Profile:  Living Environment: Pt lives with wife and sister-in-law in 1SH with ramp entrance.  Previous level of function: PTA, pt's wife report she assisted pt with all ADLs. Pt does not complete IADLs or drive. Pt sleeps in a recliner and was using RW for ambulation and WC for community mobility. Wife drives pt to appointments. Pt has HH OT and PT 2x/week  Roles and Routines:   Equipment Owned:  grab bar, shower chair, walker, rolling, raised toilet, CPAP  Assistance upon Discharge: wife and sister-in-law to assist as needed following d/c. HH OT/PT to resume following d/c     Past Medical History:   Diagnosis Date    AAA (abdominal aortic aneurysm)     Arthritis     Bloodstream infection     Cancer     prostste    Diabetes mellitus     Heart disease     Hypertension        Past Surgical History:   Procedure Laterality Date    ABDOMINAL AORTIC ANEURYSM REPAIR      COCHLEAR IMPLANT REVISION      PROSTATECTOMY         Subjective     Chief Complaint: hasn't had lunch  Patient/Family stated goals: get home  Communicated with: RN prior to session.  Pain/Comfort:  · Pain Rating 1: 0/10  · Pain Addressed 1: Reposition, Distraction  · Pain Rating Post-Intervention 1: 0/10    Patients cultural, spiritual, [employer] conflicts given the current situation: none reported    Objective:     Patient found with: oxygen    General Precautions: Standard, fall   Orthopedic Precautions:N/A   Braces: N/A     Occupational Performance:    Bed Mobility:    · Patient completed Scooting/Bridging with stand by assistance and with side rail; dependent drawsheet t/f to HOB  · Patient completed Supine  to Sit with stand by assistance  · Patient completed Sit to Supine with stand by assistance    Functional Mobility/Transfers:  · Patient completed Sit <> Stand Transfer with minimum assistance  with  rolling walker   · Functional Mobility: Pt take 2 steps forward/backward with CGA using RW    Activities of Daily Living:  · Toileting: maximal assistance to doff/don brief in standing    Cognitive/Visual Perceptual:  Cognitive/Psychosocial Skills:     -       Oriented to: Person and Place   -       Follows Commands/attention:Follows one-step commands  -       Communication: clear/fluent  -       Memory: Impaired LTM  -       Safety awareness/insight to disability: impaired and pt is impulsive   -       Mood/Affect/Coping skills/emotional control: Appropriate to situation  Visual/Perceptual:      -Intact    Physical Exam:  Balance:    -       good sitting balance, fair static/dynamic standing balance  Postural examination/scapula alignment:    -       Rounded shoulders  -       Forward head  -       Kyphosis  Skin integrity: Visible skin intact  Edema:  None noted  Sensation:    -       Impaired  Pt reports constant numbness in B fingertips; wife states pt has numbness in feet as well 2/2 neuropathy  Dominant hand:    -       R hand  Upper Extremity Range of Motion:     -       Right Upper Extremity: WFL  -       Left Upper Extremity: WFL  Upper Extremity Strength:    -       Right Upper Extremity: WFL  -       Left Upper Extremity: WFL   Strength:    -       Right Upper Extremity: WFL  -       Left Upper Extremity: WFL  Fine Motor Coordination:    -       Intact  Gross motor coordination:   WFL    Patient left supine with all lines intact, call button in reach and wife, sister-in-law present    AMPA 6 Click:  AMPAC Total Score: 15    Treatment & Education:  Pt/family educated on OT options in hospital and at home. Pt/wife choose to d/c acute OT services and would like to resume  OT following pt d/c  Pt/family  "educated on role of OT/POC  White board/communication board updated  Education:    Assessment:     Luis Belcher is a 87 y.o. male with a medical diagnosis of Sepsis. At this time, patient is functioning at their prior level of function and does not require further acute OT services.     Clinical Decision Makin.  OT Low:  "Pt evaluation falls under low complexity for evaluation coding due to performance deficits noted in 1-3 areas as stated above and 0 co-morbities affecting current functional status. Data obtained from problem focused assessments. No modifications or assistance was required for completion of evaluation. Only brief occupational profile and history review completed."     Plan:     During this hospitalization, patient does not require further acute OT services.  Please re-consult if situation changes.    · Plan of Care Reviewed with: patient, spouse, family    This Plan of care has been discussed with the patient who was involved in its development and understands and is in agreement with the identified goals and treatment plan    GOALS:    Occupational Therapy Goals        Problem: Occupational Therapy Goal    Goal Priority Disciplines Outcome Interventions   Occupational Therapy Goal     OT, PT/OT                     Time Tracking:     OT Date of Treatment: 18  OT Start Time: 1255  OT Stop Time: 1312  OT Total Time (min): 17 min    Billable Minutes:Evaluation 17    Raven Oneal OT  3/21/2018    "

## 2018-03-21 NOTE — PROGRESS NOTES
Pt arrived to room per ED stretcher. AAO x4. VSS. Pt denies pain or discomfort. Urinal placed at bedside. Skin shearing noted to coccyx; wedge pillow utilized to place pt on left side. Tele monitoring initiated. Pt oriented to room, call light, and call system. Will continue to monitor.

## 2018-03-21 NOTE — SUBJECTIVE & OBJECTIVE
Past Medical History:   Diagnosis Date    AAA (abdominal aortic aneurysm)     Arthritis     Bloodstream infection     Cancer     prostste    Diabetes mellitus     Heart disease     Hypertension        Past Surgical History:   Procedure Laterality Date    ABDOMINAL AORTIC ANEURYSM REPAIR      COCHLEAR IMPLANT REVISION      PROSTATECTOMY         Review of patient's allergies indicates:  No Known Allergies    Medications:  Prescriptions Prior to Admission   Medication Sig    apixaban (ELIQUIS) 2.5 mg Tab Take by mouth 2 (two) times daily.    ezetimibe (ZETIA) 10 mg tablet     gabapentin (NEURONTIN) 300 MG capsule     montelukast (SINGULAIR) 10 mg tablet Take 10 mg by mouth every evening.    potassium chloride SA (K-DUR,KLOR-CON) 10 MEQ tablet Take 10 mEq by mouth 2 (two) times daily.    rOPINIRole (REQUIP) 0.5 MG tablet     sildenafil, antihypertensive, (REVATIO) 20 mg Tab     simvastatin (ZOCOR) 40 MG tablet Take 40 mg by mouth every evening.    torsemide (DEMADEX) 20 MG Tab Take 20 mg by mouth once daily.    UBIQUINONE ORAL Take by mouth.    amLODIPine (NORVASC) 5 MG tablet     atenolol (TENORMIN) 25 MG tablet Take 25 mg by mouth once daily.    calcium citrate-vitamin D3 315-200 mg (CITRACAL+D) 315-200 mg-unit per tablet Take 1 tablet by mouth 2 (two) times daily.    JANUVIA 25 mg Tab     lisinopril (PRINIVIL,ZESTRIL) 40 MG tablet     warfarin (COUMADIN) 2 MG tablet Take 2 mg by mouth once daily.     Antibiotics     Start     Stop Route Frequency Ordered    03/21/18 2100  vancomycin (VANCOCIN) 1,250 mg in sodium chloride 0.9% 250 mL IVPB  (Vancomycin IVPB with levels panel)      -- IV Every 24 hours (non-standard times) 03/21/18 0025    03/21/18 0700  cefTRIAXone (ROCEPHIN) 2 g in dextrose 5 % 50 mL IVPB      -- IV Every 24 hours (non-standard times) 03/21/18 0025        Antifungals     None        Antivirals     None             There is no immunization history on file for this  patient.    Family History     None        Social History     Social History    Marital status:      Spouse name: N/A    Number of children: N/A    Years of education: N/A     Social History Main Topics    Smoking status: Former Smoker    Smokeless tobacco: Never Used    Alcohol use No    Drug use: Unknown    Sexual activity: Not Asked     Other Topics Concern    None     Social History Narrative    None     Review of Systems   Constitutional: Positive for chills (resolved) and fever (resolved). Negative for activity change, appetite change, fatigue and unexpected weight change.   HENT: Negative for ear pain and rhinorrhea.    Eyes: Negative for pain and redness.   Respiratory: Negative for cough, shortness of breath and wheezing.    Cardiovascular: Negative for chest pain, palpitations and leg swelling.   Gastrointestinal: Positive for nausea (on admission - resolved) and vomiting (on admission - resolved). Negative for abdominal pain, constipation and diarrhea.   Endocrine: Negative for polydipsia and polyuria.   Genitourinary: Negative for dysuria and hematuria.   Musculoskeletal: Negative for back pain and neck pain.   Skin: Negative for color change and rash.   Neurological: Negative for light-headedness and headaches.   Hematological: Negative for adenopathy. Does not bruise/bleed easily.   Psychiatric/Behavioral: Negative for confusion. The patient is not nervous/anxious.      Objective:     Vital Signs (Most Recent):  Temp: 97 °F (36.1 °C) (03/21/18 1250)  Pulse: 60 (03/21/18 1250)  Resp: 18 (03/21/18 1250)  BP: (!) 142/63 (03/21/18 1250)  SpO2: 98 % (03/21/18 1250) Vital Signs (24h Range):  Temp:  [96.8 °F (36 °C)-103.1 °F (39.5 °C)] 97 °F (36.1 °C)  Pulse:  [] 60  Resp:  [16-27] 18  SpO2:  [83 %-99 %] 98 %  BP: (101-151)/(50-74) 142/63     Weight: 83.9 kg (185 lb)  Body mass index is 28.98 kg/m².    Estimated Creatinine Clearance: 33.7 mL/min (A) (based on SCr of 1.6 mg/dL  (H)).    Physical Exam   Constitutional: He is oriented to person, place, and time. He appears well-developed and well-nourished. No distress.   HENT:   Head: Normocephalic and atraumatic.   Eyes: Conjunctivae and EOM are normal. No scleral icterus.   Neck: Normal range of motion. Neck supple.   Cardiovascular: Normal rate and normal heart sounds.  An irregularly irregular rhythm present. Exam reveals no friction rub.    No murmur heard.  Pulmonary/Chest: Effort normal and breath sounds normal. No respiratory distress. He has no wheezes. He has no rales.   Abdominal: Soft. Bowel sounds are normal. He exhibits no distension. There is no tenderness. There is no guarding.   Soft, ventral hernia     Musculoskeletal: Normal range of motion. He exhibits no edema or tenderness.   Neurological: He is alert and oriented to person, place, and time.   Skin: Skin is warm and dry. No rash noted. He is not diaphoretic.   Psychiatric: He has a normal mood and affect. His behavior is normal.       Significant Labs:   Blood Culture:   Recent Labs  Lab 03/20/18 1938   LABBLOO No Growth to date  No Growth to date     CBC:   Recent Labs  Lab 03/20/18 1923 03/21/18  0536   WBC 13.00* 14.85*   HGB 9.2* 7.7*   HCT 29.2* 25.0*    187     CMP:   Recent Labs  Lab 03/20/18 1923 03/21/18  0536    142   K 3.6 3.5   CL 98 105   CO2 28 29   * 115*   BUN 35* 28*   CREATININE 1.7* 1.6*   CALCIUM 9.0 8.4*   PROT 7.5 6.4   ALBUMIN 3.1* 2.6*   BILITOT 0.8 0.7   ALKPHOS 105 80   AST 18 16   ALT 14 12   ANIONGAP 14 8   EGFRNONAA 35.5* 38.2*     Procalcitonin: No results for input(s): PROCAL in the last 48 hours.    Significant Imaging: I have reviewed all pertinent imaging results/findings within the past 24 hours.

## 2018-03-21 NOTE — ASSESSMENT & PLAN NOTE
Unknown type or EF  Currently euvolemic, however will monitor closely for decompensation  Will hold home torsemide 20mg for now in setting of sepsis, but consider restarting if patient begins to appear fluid overloaded

## 2018-03-21 NOTE — SUBJECTIVE & OBJECTIVE
Past Medical History:   Diagnosis Date    AAA (abdominal aortic aneurysm)     Arthritis     Bloodstream infection     Cancer     prostste    Diabetes mellitus     Heart disease     Hypertension        Past Surgical History:   Procedure Laterality Date    ABDOMINAL AORTIC ANEURYSM REPAIR      COCHLEAR IMPLANT REVISION      PROSTATECTOMY         Review of patient's allergies indicates:  No Known Allergies    No current facility-administered medications on file prior to encounter.      Current Outpatient Prescriptions on File Prior to Encounter   Medication Sig    ezetimibe (ZETIA) 10 mg tablet     gabapentin (NEURONTIN) 300 MG capsule     montelukast (SINGULAIR) 10 mg tablet Take 10 mg by mouth every evening.    potassium chloride SA (K-DUR,KLOR-CON) 10 MEQ tablet Take 10 mEq by mouth 2 (two) times daily.    rOPINIRole (REQUIP) 0.5 MG tablet     sildenafil, antihypertensive, (REVATIO) 20 mg Tab     simvastatin (ZOCOR) 40 MG tablet Take 40 mg by mouth every evening.    torsemide (DEMADEX) 20 MG Tab Take 20 mg by mouth once daily.    UBIQUINONE ORAL Take by mouth.    amLODIPine (NORVASC) 5 MG tablet     atenolol (TENORMIN) 25 MG tablet Take 25 mg by mouth once daily.    calcium citrate-vitamin D3 315-200 mg (CITRACAL+D) 315-200 mg-unit per tablet Take 1 tablet by mouth 2 (two) times daily.    JANUVIA 25 mg Tab     lisinopril (PRINIVIL,ZESTRIL) 40 MG tablet     warfarin (COUMADIN) 2 MG tablet Take 2 mg by mouth once daily.     Family History     None        Social History Main Topics    Smoking status: Former Smoker    Smokeless tobacco: Never Used    Alcohol use No    Drug use: Unknown    Sexual activity: Not on file     Review of Systems   Constitutional: Positive for chills, fatigue and fever. Negative for activity change, appetite change and unexpected weight change.   HENT: Negative for ear pain and rhinorrhea.    Eyes: Negative for pain and redness.   Respiratory: Negative for cough,  shortness of breath and wheezing.    Cardiovascular: Negative for chest pain, palpitations and leg swelling.   Gastrointestinal: Negative for abdominal pain, constipation, diarrhea, nausea and vomiting.   Endocrine: Negative for polydipsia and polyuria.   Genitourinary: Negative for dysuria and hematuria.   Musculoskeletal: Negative for back pain and neck pain.   Skin: Negative for color change and rash.   Neurological: Negative for light-headedness and headaches.   Hematological: Negative for adenopathy. Does not bruise/bleed easily.   Psychiatric/Behavioral: Negative for confusion. The patient is not nervous/anxious.      Objective:     Vital Signs (Most Recent):  Temp: 99.3 °F (37.4 °C) (03/20/18 2222)  Pulse: 74 (03/20/18 2221)  Resp: (!) 22 (03/20/18 2221)  BP: (!) 101/53 (03/20/18 2207)  SpO2: 98 % (03/20/18 2221) Vital Signs (24h Range):  Temp:  [99.3 °F (37.4 °C)-103.1 °F (39.5 °C)] 99.3 °F (37.4 °C)  Pulse:  [] 74  Resp:  [18-27] 22  SpO2:  [83 %-99 %] 98 %  BP: (101-151)/(53-74) 101/53     Weight: 83.9 kg (185 lb)  Body mass index is 28.98 kg/m².    Physical Exam   Constitutional: He is oriented to person, place, and time. He appears well-developed and well-nourished. No distress.   HENT:   Head: Normocephalic and atraumatic.   Mouth/Throat: Oropharynx is clear and moist.   Dental caries   Eyes: Conjunctivae and EOM are normal. No scleral icterus.   Neck: Normal range of motion. Neck supple.   Cardiovascular: Exam reveals no friction rub.    No murmur heard.  Irregular but rate 60-80 bpm   Pulmonary/Chest: Effort normal. No respiratory distress. He has no wheezes. He has rales (few bibasilar).   Abdominal: Soft. Bowel sounds are normal. He exhibits no distension. There is no tenderness. There is no guarding.   Midline hernia at , soft and easily reducible   Musculoskeletal: Normal range of motion. He exhibits no edema or tenderness.   No back pain   Neurological: He is alert and oriented to person,  place, and time.   Skin: Skin is warm and dry. No rash noted. He is not diaphoretic.   Psychiatric: He has a normal mood and affect. His behavior is normal.         CRANIAL NERVES     CN III, IV, VI   Extraocular motions are normal.        Significant Labs:   CBC:   Recent Labs  Lab 03/20/18 1923   WBC 13.00*   HGB 9.2*   HCT 29.2*        CMP:   Recent Labs  Lab 03/20/18 1923      K 3.6   CL 98   CO2 28   *   BUN 35*   CREATININE 1.7*   CALCIUM 9.0   PROT 7.5   ALBUMIN 3.1*   BILITOT 0.8   ALKPHOS 105   AST 18   ALT 14   ANIONGAP 14   EGFRNONAA 35.5*     Lipase:   Recent Labs  Lab 03/20/18 1923   LIPASE 35       Significant Imaging: I have reviewed all pertinent imaging results/findings within the past 24 hours.

## 2018-03-21 NOTE — ASSESSMENT & PLAN NOTE
Secondary to DM 2  Stage three per [facility] discharge paperwork  Cr on admission 1.7  Renal function may be affected by CTA, however contrast will be necessary to evaluate for source of infection. Patient has already received fluid bolus in the ED

## 2018-03-21 NOTE — ED PROVIDER NOTES
Encounter Date: 3/20/2018       History     Chief Complaint   Patient presents with    Fever     headache, fever and chills began today.      Patient is a 87-year-old male with hypertension, prostate cancer, diabetes type 2, AAA, CHF who presents with fever and chills.  Patient notes that this morning he attained a fever out of nowhere, and had a headache that he rated 4 out of 10 behind his right eye that just lasted for 5 seconds and then went away, as well as some confusion, chills, and vomiting that occurred 2 times this morning without blood.  Patients wife notes that 8 years ago the patient had a fever and chills with the same presentation that is having at the moment. At that time he was given strong IV antibiotics and had been on it while living in [place] for 7 years, stopping 1 year ago.  Patient was fine at that time, but then this last October he had the same symptoms come back of fever and chills, and was hospitalized in [place] requiring IV antibiotics and being discharged with oral antibiotics.  The patient has been hospitalized twice in [place], and then moved to [place] in December because the patient's wife and family are in [place].  In [place] he has been hospitalized twice since December with the same symptoms, with the wife noting that this is the worst episode that he has had.  Patient had many medication changes on his last visit.  Patient denies shortness of breath, chest pain, abdominal pain, diarrhea, dysuria, swelling in the legs, rashes          Review of patient's allergies indicates:  No Known Allergies  Past Medical History:   Diagnosis Date    AAA (abdominal aortic aneurysm)     Arthritis     Bloodstream infection     Cancer     prostste    Diabetes mellitus     Heart disease     Hypertension      Past Surgical History:   Procedure Laterality Date    ABDOMINAL AORTIC ANEURYSM REPAIR      COCHLEAR IMPLANT REVISION      PROSTATECTOMY       History reviewed. No  pertinent family history.  Social History   Substance Use Topics    Smoking status: Former Smoker    Smokeless tobacco: Never Used    Alcohol use No     Review of Systems   Constitutional: Positive for chills and fever. Negative for unexpected weight change.   HENT: Negative for hearing loss.    Eyes: Negative for visual disturbance.   Respiratory: Negative for shortness of breath and wheezing.    Cardiovascular: Negative for chest pain and leg swelling.   Gastrointestinal: Positive for vomiting. Negative for abdominal pain, constipation, diarrhea and nausea.   Genitourinary: Negative for dysuria and hematuria.   Musculoskeletal: Negative for arthralgias.   Skin: Negative for rash.   Neurological: Negative for seizures and weakness.   Hematological: Negative for adenopathy. Does not bruise/bleed easily.   Psychiatric/Behavioral: Positive for confusion.       Physical Exam     Initial Vitals [03/20/18 1832]   BP Pulse Resp Temp SpO2   (!) 150/74 102 18 (!) 103.1 °F (39.5 °C) 99 %      MAP       99.33         Physical Exam    Vitals reviewed.  Constitutional: He appears well-developed and well-nourished. He is not diaphoretic. He appears distressed.   HENT:   Head: Normocephalic and atraumatic.   Eyes: EOM are normal. Pupils are equal, round, and reactive to light.   Neck: Normal range of motion. No thyromegaly present. No tracheal deviation present.   Negative budzinski sign   Cardiovascular: Normal rate and regular rhythm.   No murmur heard.  Pulmonary/Chest: He has no wheezes. He has rales (LLL).   Abdominal: Soft. He exhibits no distension. There is no tenderness.   Patient with obvious reducible umbilical hernia.   Musculoskeletal: Normal range of motion.   Lymphadenopathy:     He has no cervical adenopathy.   Neurological: He is alert and oriented to person, place, and time. No sensory deficit.   Skin: Skin is warm and dry. No rash noted.   Psychiatric: He has a normal mood and affect. Thought content normal.          ED Course   Procedures  Labs Reviewed   CBC W/ AUTO DIFFERENTIAL - Abnormal; Notable for the following:        Result Value    WBC 13.00 (*)     RBC 3.26 (*)     Hemoglobin 9.2 (*)     Hematocrit 29.2 (*)     MCHC 31.5 (*)     RDW 15.2 (*)     Immature Granulocytes 0.8 (*)     Gran # (ANC) 11.6 (*)     Immature Grans (Abs) 0.10 (*)     Lymph # 0.8 (*)     Gran% 89.1 (*)     Lymph% 5.8 (*)     Mono% 3.7 (*)     All other components within normal limits   CULTURE, BLOOD   CULTURE, BLOOD   CULTURE, BLOOD   COMPREHENSIVE METABOLIC PANEL   LACTIC ACID, PLASMA   URINALYSIS, REFLEX TO URINE CULTURE   LIPASE   SEDIMENTATION RATE, MANUAL   HIGH SENSITIVITY CRP                   APC / Resident Notes:   - Patient is a 87 y.o. male with hypertension, prostate cancer, diabetes type 2, CHF, AAA, who presents with fever and chills.  - Suspect: PNA vs Fever of unknown origin- patient has crackles in the left lower lung and increased oxygen requirements despite no additional oxygen requirements. He also had recurrent fevers for 8 years, that no one has been able to diagnose.  It appears  - DDx: cancer vs vasculitis vs osteomyelitis vs endocarditis  - Lab/rad/micro: CBC, CMP, LA, UA, CXR, CTH, CRP, ESR, lipase, VBG  - Consult: NA  - Treatment: IVF, Pip-Pop, Vanc  - Status: currently patient is asymptomatic/stable, continue to monitor    8:18pm  - Lab/Rad/Micro interpretation: CBC- mild leukocytosis, Cr 1.7, CRP 58, LA 2.3, Metabolic alkalosis on VBG  - Disposition: Patient is requesting to be transferred to [facility], due to this being his hospital where his specialists are located.  We have contacted the transfer center, as patient is stable and can be transferred at this time.  Patient has refused chest x-ray and head CT, due to transfer.                   Clinical Impression:   The encounter diagnosis was Sepsis.          I have reviewed and concur with the resident's history, physical, assessment, and plan.  I have  personally interviewed and examined the patient at bedside.  See below addendum for my evaluation and additional findings.    Patient w/ Hx of CHF, unknown LVEF, will carefully give IV fluids (not 30 cc/kg bolus) and asess for fluid overload    Transfer center called: [facility] is not able to accept patients, no beds available. Discussed with the family, I do not think patient is stable to go to [facility] ED, I advised the patient and family it's better to stay and be admitted. Family and patient agrees. Admitted to medicine. BP soft 101/53, pulse 75, repeat lactate wnl at the time of admission                 Rubina Teixeira MD  03/21/18 1242

## 2018-03-21 NOTE — CARE UPDATE
Met with patient's wife, her two sisters and their great niece to discuss patient's care. Wife reports that her and her  recently moved here from [place] a few months ago so that they could be around more family. She indicates that Mr. Belcher has had multiple infections in the past requiring IV abx. He had been on suppressive oral antibiotics for several years (7-8?) which had worked well to valente off multiple admissions of bacteremia. Her doctors in [place] had decided to take  off antibiotics right before moving here given his relative stability off antibiotics and his other co-mordities such as prostate cancer. He most recently finished his last round of IV abx on December 24 and was most recently on cipro and zyvox for an unclear duration of time. Discussed that he would not be a surgical candidate as per IR and Vascular Surgery.     Since [place], they have transferred all of their care to [facility] where they have established with a new ID doctor. Patient's family was initially upset that they were brought to [facility] given the lack of continuity of care. Discussed with family that we would be more than happy to facilitate transfer but that it would likely be an out of pocket expense for them. At this time, Ms. Walker feels that she would like to stay given how fatigued this has made her . Reassured them that they could still follow up with their original [facility] doctors. Family, understandably, expressed disdain over having to share a room with another patient. Will attempt to have patient moved to a private room.     Chrissie Restrepo MD, PGY3  Pager [phone]

## 2018-03-21 NOTE — PLAN OF CARE
Problem: Patient Care Overview  Goal: Plan of Care Review  Outcome: Ongoing (interventions implemented as appropriate)  Pt aaox4, vs stable, no falls or injuries. Fall precautions in place w/ personal items near by. Pain level being monitor and control by medication. No signs of infection or distress. Call light in reach; will continue to monitor pt.

## 2018-03-21 NOTE — H&P
"[facility] Hospital Medicine  History & Physical    Patient Name: Lius Belcher  MRN: [id number]  Admission Date: 3/20/2018  Attending Physician: Kasie Osborne MD   Primary Care Provider: Laura Arrieta DO    [facility] Medicine Team: [facility] HOSP MED 4 Gabbie Leblanc DO     Patient information was obtained from patient, spouse/SO, relative(s), past medical records and ER records.     Subjective:     Principal Problem:Sepsis    Chief Complaint:   Chief Complaint   Patient presents with    Fever     headache, fever and chills began today.         HPI: Mr. Luis Belcher is an 87 year old male with HTN, DM 2, CHF (unknown type or EF ), AAA s/p endovascular repair in 2001, atrial fibrillation, CKD stage 3 , and prostate cancer s/p radical prostatectomy 20 years ago. He is being admitted for fever.    Mr. Belcher recently moved to [place] from [place] this past December and has thus far been treated at [facility]. No records are available at this time. He  has a long history of recurrent fevers, which first occurred about 7 years ago. He has been on and off suppressive therapy with antibiotics over this course, and most recently completed a six week course of antibiotics on 12/18/17, at which time he moved from [place] to [place]. Family does not know which antibiotic he was on. Patient's wife reports he was followed by an infectious disease doctor in [place], and his infection stemmed from "a pocket of infection by his aorta", and that he was not a surgical candidate.      Most recently, Mr. Belcher was admitted to [facility] on 3/06/18 for fever, chills, n/v, weakness, and shortness of breath. The paperwork they provide is limited, but his discharge diagnoses included CHF exacerbation, elevated lactic acid, elevated troponin, and afib. He was discharged home on a course of ciprofloxacin 250mg bid and linezolid 600mg bid (uncertain length of course). Wife reports compliance with " medications.     Today, Mr. Belcher presented to the ED after this afternoon, he suddenly developed a fever of 103 F, and felt weak, with chills, nausea, and two episodes of nonbloody emesis. He also complained of right-sided temporal headache, which he described as throbbing, but has since resolved. He was treated in the ED with a 30cc/kg bolus, acetaminophen, ibuprofen, and IV vancomycin and piperacillin/tazobactam. He now feels much more comfortable and has no complaints.         Past Medical History:   Diagnosis Date    AAA (abdominal aortic aneurysm)     Arthritis     Bloodstream infection     Cancer     prostste    Diabetes mellitus     Heart disease     Hypertension        Past Surgical History:   Procedure Laterality Date    ABDOMINAL AORTIC ANEURYSM REPAIR      COCHLEAR IMPLANT REVISION      PROSTATECTOMY         Review of patient's allergies indicates:  No Known Allergies    No current facility-administered medications on file prior to encounter.      Current Outpatient Prescriptions on File Prior to Encounter   Medication Sig    ezetimibe (ZETIA) 10 mg tablet     gabapentin (NEURONTIN) 300 MG capsule     montelukast (SINGULAIR) 10 mg tablet Take 10 mg by mouth every evening.    potassium chloride SA (K-DUR,KLOR-CON) 10 MEQ tablet Take 10 mEq by mouth 2 (two) times daily.    rOPINIRole (REQUIP) 0.5 MG tablet     sildenafil, antihypertensive, (REVATIO) 20 mg Tab     simvastatin (ZOCOR) 40 MG tablet Take 40 mg by mouth every evening.    torsemide (DEMADEX) 20 MG Tab Take 20 mg by mouth once daily.    UBIQUINONE ORAL Take by mouth.    amLODIPine (NORVASC) 5 MG tablet     atenolol (TENORMIN) 25 MG tablet Take 25 mg by mouth once daily.    calcium citrate-vitamin D3 315-200 mg (CITRACAL+D) 315-200 mg-unit per tablet Take 1 tablet by mouth 2 (two) times daily.    JANUVIA 25 mg Tab     lisinopril (PRINIVIL,ZESTRIL) 40 MG tablet     warfarin (COUMADIN) 2 MG tablet Take 2 mg by mouth once  daily.     Family History     None        Social History Main Topics    Smoking status: Former Smoker    Smokeless tobacco: Never Used    Alcohol use No    Drug use: Unknown    Sexual activity: Not on file     Review of Systems   Constitutional: Positive for chills, fatigue and fever. Negative for activity change, appetite change and unexpected weight change.   HENT: Negative for ear pain and rhinorrhea.    Eyes: Negative for pain and redness.   Respiratory: Negative for cough, shortness of breath and wheezing.    Cardiovascular: Negative for chest pain, palpitations and leg swelling.   Gastrointestinal: Negative for abdominal pain, constipation, diarrhea, nausea and vomiting.   Endocrine: Negative for polydipsia and polyuria.   Genitourinary: Negative for dysuria and hematuria.   Musculoskeletal: Negative for back pain and neck pain.   Skin: Negative for color change and rash.   Neurological: Negative for light-headedness and headaches.   Hematological: Negative for adenopathy. Does not bruise/bleed easily.   Psychiatric/Behavioral: Negative for confusion. The patient is not nervous/anxious.      Objective:     Vital Signs (Most Recent):  Temp: 99.3 °F (37.4 °C) (03/20/18 2222)  Pulse: 74 (03/20/18 2221)  Resp: (!) 22 (03/20/18 2221)  BP: (!) 101/53 (03/20/18 2207)  SpO2: 98 % (03/20/18 2221) Vital Signs (24h Range):  Temp:  [99.3 °F (37.4 °C)-103.1 °F (39.5 °C)] 99.3 °F (37.4 °C)  Pulse:  [] 74  Resp:  [18-27] 22  SpO2:  [83 %-99 %] 98 %  BP: (101-151)/(53-74) 101/53     Weight: 83.9 kg (185 lb)  Body mass index is 28.98 kg/m².    Physical Exam   Constitutional: He is oriented to person, place, and time. He appears well-developed and well-nourished. No distress.   HENT:   Head: Normocephalic and atraumatic.   Mouth/Throat: Oropharynx is clear and moist.   Dental caries   Eyes: Conjunctivae and EOM are normal. No scleral icterus.   Neck: Normal range of motion. Neck supple.   Cardiovascular: Exam reveals  "no friction rub.    No murmur heard.  Irregular but rate 60-80 bpm   Pulmonary/Chest: Effort normal. No respiratory distress. He has no wheezes. He has rales (few bibasilar).   Abdominal: Soft. Bowel sounds are normal. He exhibits no distension. There is no tenderness. There is no guarding.   Midline hernia at , soft and easily reducible   Musculoskeletal: Normal range of motion. He exhibits no edema or tenderness.   No back pain   Neurological: He is alert and oriented to person, place, and time.   Skin: Skin is warm and dry. No rash noted. He is not diaphoretic.   Psychiatric: He has a normal mood and affect. His behavior is normal.         CRANIAL NERVES     CN III, IV, VI   Extraocular motions are normal.        Significant Labs:   CBC:   Recent Labs  Lab 03/20/18 1923   WBC 13.00*   HGB 9.2*   HCT 29.2*        CMP:   Recent Labs  Lab 03/20/18 1923      K 3.6   CL 98   CO2 28   *   BUN 35*   CREATININE 1.7*   CALCIUM 9.0   PROT 7.5   ALBUMIN 3.1*   BILITOT 0.8   ALKPHOS 105   AST 18   ALT 14   ANIONGAP 14   EGFRNONAA 35.5*     Lipase:   Recent Labs  Lab 03/20/18 1923   LIPASE 35       Significant Imaging: I have reviewed all pertinent imaging results/findings within the past 24 hours.    Assessment/Plan:     * Sepsis    Patient presented with fever of 103.1 F, WBC 13, respiratory rate >20, and pulse >90  Lactic acid initially 2.4, downtrended to 1.4 after 30cc/kg bolus  Given reported history of "pocket of infection near aorta", infected aortic aneurysm is top of differential at this time  Intially treated with IV Vancomycin/Zosyn in the ED  - follow blood cultures  - continue empiric treatment with IV Vancomycin and Ceftriaxone  - obtain CTA of chest and abdomen  - consult infectious disease          Abdominal aortic aneurysm (AAA) without rupture    S/P endovascular repair in 2001  - see "sepsis"        Chronic atrial fibrillation    Currently rate controlled  Home med: apixaban 2.5mg " "bid, will continue        Stage 3 chronic kidney disease    Secondary to DM 2  Stage three per [facility] discharge paperwork  Cr on admission 1.7  Renal function may be affected by CTA, however contrast will be necessary to evaluate for source of infection. Patient has already received fluid bolus in the ED        Hypertension    No antihypertensives on home med list, however did take antihypertensives in the past        Type 2 diabetes mellitus with renal complication    No diabetes medications on home med list, however used to take Januvia  - POCT glucose ac+hs  - SSI  - diabetic diet  - hypoglycemic protocol        Chronic congestive heart failure    Unknown type or EF  Currently euvolemic, however will monitor closely for decompensation  Will hold home torsemide 20mg for now in setting of sepsis, but consider restarting if patient begins to appear fluid overloaded          Prostate cancer    History of prostate cancer s/p radical prostatectomy in 2001  However, per wife, patient has had "rising PSAs over the past few months". She reports that this was being monitored by urologist in [place]  Will consider as source of fever if CTA unrevealing        Sleep apnea    CPAP qhs: EPAP 4 per wife          Anemia    Normocytic anemia, hemoglobin 9.2  Likely anemia of chronic inflammation  - check iron panel, B12, folate            VTE Risk Mitigation         Ordered     apixaban tablet 2.5 mg  2 times daily     Route:  Oral        03/20/18 3853             Gabbie Leblanc,   Department of Hospital Medicine   [facility]  "

## 2018-03-21 NOTE — PLAN OF CARE
Problem: Occupational Therapy Goal  Goal: Occupational Therapy Goal  Outcome: Outcome(s) achieved Date Met: 03/21/18  Eval completed; no acute OT needs this date

## 2018-03-21 NOTE — HPI
"Mr. Luis Belcher is an 87 year old male with HTN, DM 2, CHF (unknown type or EF ), AAA s/p endovascular repair in 2001, atrial fibrillation, CKD stage 3 , and prostate cancer s/p radical prostatectomy 20 years ago. He is being admitted for fever.    Mr. Belcher recently moved to [place] from [place] this past December and has thus far been treated at [facility]. No records are available at this time. He  has a long history of recurrent fevers, which first occurred about 7 years ago. He has been on and off suppressive therapy with antibiotics over this course, and most recently completed a six week course of antibiotics on 12/18/17, at which time he moved from [place] to [place]. Family does not know which antibiotic he was on. Patient's wife reports he was followed by an infectious disease doctor in [place], and his infection stemmed from "a pocket of infection by his aorta", and that he was not a surgical candidate.      Most recently, Mr. Belcher was admitted to [facility] on 3/06/18 for fever, chills, n/v, weakness, and shortness of breath. The paperwork they provide is limited, but his discharge diagnoses included CHF exacerbation, elevated lactic acid, elevated troponin, and afib. He was discharged home on a course of ciprofloxacin 250mg bid and linezolid 600mg bid (uncertain length of course). Wife reports compliance with medications.     Today, Mr. Belcher presented to the ED after this afternoon, he suddenly developed a fever of 103 F, and felt weak, with chills, nausea, and two episodes of nonbloody emesis. He also complained of right-sided temporal headache, which he described as throbbing, but has since resolved. He was treated in the ED with a 30cc/kg bolus, acetaminophen, ibuprofen, and IV vancomycin and piperacillin/tazobactam. He now feels much more comfortable and has no complaints.       "

## 2018-03-21 NOTE — HPI
"  Mr. Belcher is an 87 year old male with HTN, DM 2, CHF (unknown type or EF ), AAA s/p endovascular repair in 2001, atrial fibrillation, CKD stage 3 , and prostate cancer s/p radical prostatectomy 20 years ago. He is being admitted for fevers, chills, n/v.  He recently moved from [place] to [place] in December. He has a long history of recurrent fevers, which first occurred about 7 years ago. He has been on and off suppressive therapy with antibiotics over this course, and most recently completed a six week course of antibiotics on 12/18/17. Family does not know which antibiotic he was on. Patient's wife reports he was followed by an infectious disease doctor in [place], and his infection stemmed from "a pocket of infection by his aorta" (dx on nuclear medicine scan) and that he was not a surgical candidate.       Most recently, Mr. Belcher was admitted to [facility] on 3/06/18 for fever, chills, n/v, weakness, shortness of breath and wet cough. The paperwork they provide is limited, but his discharge diagnoses included CHF exacerbation, elevated lactic acid, elevated troponin, and afib. He was hospitalized until 3/11/18. During his stay he was treated with ciprofloxacin 250mg bid and linezolid 600mg bid for pneumonia and discharged off antibiotics. Of note, he had been off antibiotics prior to that admission since December. Blood cultures on 3/8 and 3/12 were NGTD at 5 days.      On 3/20, Mr. Belcher presented to the ED with sudden onset of fever of 103 F, weakness, chills,  nausea, and two episodes of nonbloody emesis. He also complained of right-sided temporal headache, which he described as throbbing, but has since resolved. He was treated in the ED with a 30cc/kg bolus, acetaminophen, ibuprofen, and IV vancomycin and piperacillin/tazobactam.  ID is consulted for recommendations.       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  Possible abscess 2.3 X 2.0 anterior to the left common iliac graft.     2D Echo - negative for vegetation     WBC 14.85  CRP 54.88    Patient reports he follows with Dr. Sorto at  Infectious Disease  ID doctor in [place] - Dr. Azul Shoemaker     At time of visit patient is afebrile and reports he "feels great".  Denies chills, sweats.  No further n/v.  Good appetite. Denies any pain.   No culture data as yet from [place].      Records received from [facility]. Summarized below.     Patient has history of a 7 cm abdominal aortic aneurysm s/p repair with open technique and hemishield graft placement 2/2001. This was complicated by an aortic graft infection which he was on oral suppressive antibiotics for many years following surgery (unclear of bacteria or antibiotic treated with). 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 This was followed by doxycyline suppression. He was taken off of antibiotic suppression in March 2017 and followed closely by ID (Dr. Shoemaker) and Dr. Suh (surgeon). Apparently a follow up CT scan 9/2017 showed perigraft inflammatory stranding (unchanged from prior 4 years) with wall thickening and air within the aneursymal sac. Unclear of treatment history - appears to not have been started on antibiotics.  About a month later he developed fevers and weakness prompting him to present to the ER in [place].     Patient was admitted to [facility] for Granulicatella adiacens bacteremia 10/30/17 placed on Vancomycin and Ceftriaxone x 6 weeks at discharge. WILTON negative during admission. Indium showed increase activity in both lungs (unclear significance - had pneumonia a few months prior - asymptomatic at time of Indium with normal CXR). No susceptibilities were run on isolate. Course complicated by GISELL and Vancomycin was switched to " Daptomycin. He completed antibiotic therapy 12/3. Repeat blood cultures at clinic follow up 12/8 were NGTD. Was off antibiotics until admission this month.     ID physician - Dr. Solis

## 2018-03-21 NOTE — ASSESSMENT & PLAN NOTE
87 year old male with HTN, DM 2, CHF (unknown type or EF ), AAA s/p endovascular repair in 2001, atrial fibrillation, CKD stage 3 , and prostate cancer s/p radical prostatectomy 20 years ago admitted with fevers (up to 103), chills, nausea.   Reported long history of IV antibiotics and  antibiotic suppression for recurrent fevers X approx. 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  Previously followed by ID in [place]. Reportedly told he had a pocket of infection by the graft and was not a surgical candidate.   Recently completed 6 week course of antibiotics in December prior to coming to [place].  He had recent admission to [facility] on 3/6/18 3/06/18 for fever, chills, n/v, weakness, and shortness of breath.  He was apparently discharged home on ciprofloxacin and linezolid.      CT chest/abd here is concerning for aortitis involving the infrarenal abd aorta at the level of the graft bifurcation to the common iliacs and possible abscess 2.3 X 2.0 anterior to the left common iliac graft.   Per Primary Team, Vascular Surgery has reviewed and advised he is not a surgical candidate.     Blood cultures 3/20 NGTD.  2D echo negative for vegetation.   CRP 54.8  WBC 14.85     He is currently on empiric vancomycin and ceftriaxone.  Afebrile, hemodynamically stable.       -  Continue IV vancomycin and ceftriaxone for now.   -  Vancomycin trough before 3rd dose.    -  Will follow blood cultures and adjust abx accordingly   -  Will get prior blood culture results from [facility].    -  Review [place] ID physician records - Authorization sent to Dr. Azul Shoemaker in [place].     -  ESR, CRP and procalcitonin tomorrow a.m.    -  Will follow up tomorrow with further recommendations.  If no growth on cultures, will likely recommend continued ciprofloxacin and zyvox, though will review  cultures and [place] records.     Patient seen by, and plan discussed with, ID staff  Discussed with Primary Team

## 2018-03-21 NOTE — ASSESSMENT & PLAN NOTE
"Patient presented with fever of 103.1 F, WBC 13, respiratory rate >20, and pulse >90  Lactic acid initially 2.4, downtrended to 1.4 after 30cc/kg bolus  Given reported history of "pocket of infection near aorta", infected aortic aneurysm is top of differential at this time  Intially treated with IV Vancomycin/Zosyn in the ED  - follow blood cultures  - continue empiric treatment with IV Vancomycin and Ceftriaxone  - obtain CTA of chest and abdomen  - consult infectious disease    "

## 2018-03-21 NOTE — ED NOTES
Patient and family have now agreed to stay at [facility] for treatment. Patient will be admitted, awaiting orders.

## 2018-03-21 NOTE — ED NOTES
Patient's family requesting that the patient is transferred to [facility] because his physicians are all at . MD notified.

## 2018-03-21 NOTE — ASSESSMENT & PLAN NOTE
Normocytic anemia, hemoglobin 9.2  Likely anemia of chronic inflammation  - check iron panel, B12, folate

## 2018-03-21 NOTE — ASSESSMENT & PLAN NOTE
"History of prostate cancer s/p radical prostatectomy in 2001  However, per wife, patient has had "rising PSAs over the past few months". She reports that this was being monitored by urologist in [place]  Will consider as source of fever if CTA unrevealing  "

## 2018-03-22 LAB
ALBUMIN SERPL BCP-MCNC: 2.6 G/DL
ALP SERPL-CCNC: 78 U/L
ALT SERPL W/O P-5'-P-CCNC: 11 U/L
ANION GAP SERPL CALC-SCNC: 10 MMOL/L
AST SERPL-CCNC: 14 U/L
BASOPHILS # BLD AUTO: 0.05 K/UL
BASOPHILS NFR BLD: 0.5 %
BILIRUB SERPL-MCNC: 0.5 MG/DL
BUN SERPL-MCNC: 22 MG/DL
CALCIUM SERPL-MCNC: 9.1 MG/DL
CHLORIDE SERPL-SCNC: 107 MMOL/L
CO2 SERPL-SCNC: 26 MMOL/L
CREAT SERPL-MCNC: 1.3 MG/DL
DIFFERENTIAL METHOD: ABNORMAL
EOSINOPHIL # BLD AUTO: 0.2 K/UL
EOSINOPHIL NFR BLD: 1.6 %
ERYTHROCYTE [DISTWIDTH] IN BLOOD BY AUTOMATED COUNT: 15.6 %
ERYTHROCYTE [SEDIMENTATION RATE] IN BLOOD BY WESTERGREN METHOD: 65 MM/HR
EST. GFR  (AFRICAN AMERICAN): 56.7 ML/MIN/1.73 M^2
EST. GFR  (NON AFRICAN AMERICAN): 49.1 ML/MIN/1.73 M^2
GLUCOSE SERPL-MCNC: 131 MG/DL
HCT VFR BLD AUTO: 25.3 %
HGB BLD-MCNC: 7.7 G/DL
IMM GRANULOCYTES # BLD AUTO: 0.04 K/UL
IMM GRANULOCYTES NFR BLD AUTO: 0.4 %
LYMPHOCYTES # BLD AUTO: 0.7 K/UL
LYMPHOCYTES NFR BLD: 7.6 %
MAGNESIUM SERPL-MCNC: 2.1 MG/DL
MCH RBC QN AUTO: 28.6 PG
MCHC RBC AUTO-ENTMCNC: 30.4 G/DL
MCV RBC AUTO: 94 FL
MONOCYTES # BLD AUTO: 1.3 K/UL
MONOCYTES NFR BLD: 13.2 %
NEUTROPHILS # BLD AUTO: 7.5 K/UL
NEUTROPHILS NFR BLD: 76.7 %
NRBC BLD-RTO: 0 /100 WBC
PHOSPHATE SERPL-MCNC: 2.8 MG/DL
PLATELET # BLD AUTO: 171 K/UL
PMV BLD AUTO: 11.6 FL
POCT GLUCOSE: 125 MG/DL (ref 70–110)
POCT GLUCOSE: 150 MG/DL (ref 70–110)
POCT GLUCOSE: 174 MG/DL (ref 70–110)
POTASSIUM SERPL-SCNC: 4.1 MMOL/L
PROT SERPL-MCNC: 6.4 G/DL
RBC # BLD AUTO: 2.69 M/UL
RPR SER QL: NORMAL
SODIUM SERPL-SCNC: 143 MMOL/L
VANCOMYCIN TROUGH SERPL-MCNC: 20.7 UG/ML
WBC # BLD AUTO: 9.8 K/UL

## 2018-03-22 PROCEDURE — 11000001 HC ACUTE MED/SURG PRIVATE ROOM

## 2018-03-22 PROCEDURE — 85025 COMPLETE CBC W/AUTO DIFF WBC: CPT

## 2018-03-22 PROCEDURE — 84100 ASSAY OF PHOSPHORUS: CPT

## 2018-03-22 PROCEDURE — 94660 CPAP INITIATION&MGMT: CPT

## 2018-03-22 PROCEDURE — 94761 N-INVAS EAR/PLS OXIMETRY MLT: CPT

## 2018-03-22 PROCEDURE — 36415 COLL VENOUS BLD VENIPUNCTURE: CPT

## 2018-03-22 PROCEDURE — 85651 RBC SED RATE NONAUTOMATED: CPT

## 2018-03-22 PROCEDURE — 25000003 PHARM REV CODE 250: Performed by: INTERNAL MEDICINE

## 2018-03-22 PROCEDURE — 99223 1ST HOSP IP/OBS HIGH 75: CPT | Mod: ,,, | Performed by: SURGERY

## 2018-03-22 PROCEDURE — 80053 COMPREHEN METABOLIC PANEL: CPT

## 2018-03-22 PROCEDURE — 99233 SBSQ HOSP IP/OBS HIGH 50: CPT | Mod: ,,, | Performed by: PHYSICIAN ASSISTANT

## 2018-03-22 PROCEDURE — 63600175 PHARM REV CODE 636 W HCPCS: Performed by: STUDENT IN AN ORGANIZED HEALTH CARE EDUCATION/TRAINING PROGRAM

## 2018-03-22 PROCEDURE — 83735 ASSAY OF MAGNESIUM: CPT

## 2018-03-22 PROCEDURE — 99232 SBSQ HOSP IP/OBS MODERATE 35: CPT | Mod: ,,, | Performed by: HOSPITALIST

## 2018-03-22 PROCEDURE — 80202 ASSAY OF VANCOMYCIN: CPT

## 2018-03-22 PROCEDURE — 25000003 PHARM REV CODE 250: Performed by: STUDENT IN AN ORGANIZED HEALTH CARE EDUCATION/TRAINING PROGRAM

## 2018-03-22 PROCEDURE — 27000221 HC OXYGEN, UP TO 24 HOURS

## 2018-03-22 RX ORDER — TORSEMIDE 20 MG/1
20 TABLET ORAL DAILY
Status: DISCONTINUED | OUTPATIENT
Start: 2018-03-22 | End: 2018-03-24 | Stop reason: HOSPADM

## 2018-03-22 RX ADMIN — POTASSIUM CHLORIDE 10 MEQ: 750 CAPSULE, EXTENDED RELEASE ORAL at 10:03

## 2018-03-22 RX ADMIN — MONTELUKAST SODIUM 10 MG: 10 TABLET, FILM COATED ORAL at 10:03

## 2018-03-22 RX ADMIN — POTASSIUM CHLORIDE 10 MEQ: 750 CAPSULE, EXTENDED RELEASE ORAL at 09:03

## 2018-03-22 RX ADMIN — CEFTRIAXONE SODIUM 2 G: 2 INJECTION, POWDER, FOR SOLUTION INTRAMUSCULAR; INTRAVENOUS at 06:03

## 2018-03-22 RX ADMIN — EZETIMIBE 10 MG: 10 TABLET ORAL at 09:03

## 2018-03-22 RX ADMIN — TORSEMIDE 20 MG: 20 TABLET ORAL at 09:03

## 2018-03-22 RX ADMIN — APIXABAN 2.5 MG: 2.5 TABLET, FILM COATED ORAL at 10:03

## 2018-03-22 RX ADMIN — APIXABAN 2.5 MG: 2.5 TABLET, FILM COATED ORAL at 09:03

## 2018-03-22 RX ADMIN — GABAPENTIN 300 MG: 300 CAPSULE ORAL at 10:03

## 2018-03-22 RX ADMIN — SIMVASTATIN 40 MG: 40 TABLET, FILM COATED ORAL at 10:03

## 2018-03-22 RX ADMIN — Medication 1250 MG: at 11:03

## 2018-03-22 RX ADMIN — ROPINIROLE HYDROCHLORIDE 0.5 MG: 0.5 TABLET, FILM COATED ORAL at 10:03

## 2018-03-22 NOTE — SUBJECTIVE & OBJECTIVE
Past Medical History:   Diagnosis Date    AAA (abdominal aortic aneurysm)     Arthritis     Bloodstream infection     Cancer     prostste    Diabetes mellitus     Heart disease     Hypertension        Past Surgical History:   Procedure Laterality Date    ABDOMINAL AORTIC ANEURYSM REPAIR      COCHLEAR IMPLANT REVISION      PROSTATECTOMY         Review of patient's allergies indicates:  No Known Allergies    Medications:  Prescriptions Prior to Admission   Medication Sig    apixaban (ELIQUIS) 2.5 mg Tab Take by mouth 2 (two) times daily.    ezetimibe (ZETIA) 10 mg tablet     gabapentin (NEURONTIN) 300 MG capsule     montelukast (SINGULAIR) 10 mg tablet Take 10 mg by mouth every evening.    potassium chloride SA (K-DUR,KLOR-CON) 10 MEQ tablet Take 10 mEq by mouth 2 (two) times daily.    rOPINIRole (REQUIP) 0.5 MG tablet     sildenafil, antihypertensive, (REVATIO) 20 mg Tab     simvastatin (ZOCOR) 40 MG tablet Take 40 mg by mouth every evening.    torsemide (DEMADEX) 20 MG Tab Take 20 mg by mouth once daily.    UBIQUINONE ORAL Take by mouth.    amLODIPine (NORVASC) 5 MG tablet     atenolol (TENORMIN) 25 MG tablet Take 25 mg by mouth once daily.    calcium citrate-vitamin D3 315-200 mg (CITRACAL+D) 315-200 mg-unit per tablet Take 1 tablet by mouth 2 (two) times daily.    JANUVIA 25 mg Tab     lisinopril (PRINIVIL,ZESTRIL) 40 MG tablet     warfarin (COUMADIN) 2 MG tablet Take 2 mg by mouth once daily.     Antibiotics     Start     Stop Route Frequency Ordered    03/21/18 2100  vancomycin (VANCOCIN) 1,250 mg in sodium chloride 0.9% 250 mL IVPB  (Vancomycin IVPB with levels panel)      -- IV Every 24 hours (non-standard times) 03/21/18 0025    03/21/18 0700  cefTRIAXone (ROCEPHIN) 2 g in dextrose 5 % 50 mL IVPB      -- IV Every 24 hours (non-standard times) 03/21/18 0025        Antifungals     None        Antivirals     None             There is no immunization history on file for this  patient.    Family History     None        Social History     Social History    Marital status:      Spouse name: N/A    Number of children: N/A    Years of education: N/A     Social History Main Topics    Smoking status: Former Smoker    Smokeless tobacco: Never Used    Alcohol use No    Drug use: Unknown    Sexual activity: Not Asked     Other Topics Concern    None     Social History Narrative    None     Review of Systems   Constitutional: Positive for chills (resolved) and fever (resolved). Negative for activity change, appetite change and fatigue.   HENT: Negative for ear pain and rhinorrhea.    Eyes: Negative for pain and redness.   Respiratory: Negative for cough and shortness of breath.    Cardiovascular: Negative for chest pain and leg swelling.   Gastrointestinal: Positive for nausea (on admission - resolved). Negative for abdominal pain, diarrhea and vomiting.   Genitourinary: Positive for frequency. Negative for dysuria and hematuria.   Musculoskeletal: Negative for back pain and neck pain.   Skin: Negative for color change and rash.   Neurological: Negative for light-headedness and headaches.   Hematological: Negative for adenopathy. Does not bruise/bleed easily.   Psychiatric/Behavioral: Negative for confusion. The patient is not nervous/anxious.      Objective:     Vital Signs (Most Recent):  Temp: 97.8 °F (36.6 °C) (03/22/18 1145)  Pulse: 66 (03/22/18 1145)  Resp: 20 (03/22/18 1145)  BP: (!) 153/66 (03/22/18 1145)  SpO2: 100 % (03/22/18 1145) Vital Signs (24h Range):  Temp:  [96 °F (35.6 °C)-98 °F (36.7 °C)] 97.8 °F (36.6 °C)  Pulse:  [57-76] 66  Resp:  [16-20] 20  SpO2:  [98 %-100 %] 100 %  BP: (142-167)/(63-76) 153/66     Weight: 83.9 kg (185 lb)  Body mass index is 28.98 kg/m².    Estimated Creatinine Clearance: 41.4 mL/min (based on SCr of 1.3 mg/dL).    Physical Exam   Constitutional: He is oriented to person, place, and time. He appears well-developed and well-nourished. No  distress.   HENT:   Head: Normocephalic and atraumatic.   Eyes: Conjunctivae are normal. No scleral icterus.   Cardiovascular: Normal rate and normal heart sounds.  An irregularly irregular rhythm present. Exam reveals no friction rub.    No murmur heard.  Pulmonary/Chest: Effort normal and breath sounds normal. No respiratory distress. He has no wheezes. He has no rales.   Abdominal: Soft. Bowel sounds are normal. He exhibits no distension. There is no tenderness. There is no guarding.   Prior surgical scar noted.  Ventral hernia.   Musculoskeletal: He exhibits no edema or tenderness.   Neurological: He is alert and oriented to person, place, and time.   Skin: Skin is warm and dry. No rash noted. He is not diaphoretic.   Psychiatric: He has a normal mood and affect. His behavior is normal.       Significant Labs:   Blood Culture:   Recent Labs  Lab 03/20/18 1938   LABBLOO No Growth to date  No Growth to date  No Growth to date  No Growth to date     CBC:   Recent Labs  Lab 03/20/18 1923 03/21/18  0536 03/22/18  0606   WBC 13.00* 14.85* 9.80   HGB 9.2* 7.7* 7.7*   HCT 29.2* 25.0* 25.3*    187 171     CMP:   Recent Labs  Lab 03/20/18 1923 03/21/18  0536 03/22/18  0606    142 143   K 3.6 3.5 4.1   CL 98 105 107   CO2 28 29 26   * 115* 131*   BUN 35* 28* 22   CREATININE 1.7* 1.6* 1.3   CALCIUM 9.0 8.4* 9.1   PROT 7.5 6.4 6.4   ALBUMIN 3.1* 2.6* 2.6*   BILITOT 0.8 0.7 0.5   ALKPHOS 105 80 78   AST 18 16 14   ALT 14 12 11   ANIONGAP 14 8 10   EGFRNONAA 35.5* 38.2* 49.1*     HIV 1/2 Antibodies: No results for input(s): TRC03EXAS in the last 48 hours.  HIV Rapid: No results for input(s): HIVRAPID in the last 48 hours.  Procalcitonin: No results for input(s): PROCAL in the last 48 hours.  Quantiferon: No results for input(s): NIL, TBAG, TBAGNIL, MITOGENNIL, TBGOLD in the last 48 hours.  Respiratory Culture: No results for input(s): GSRESP, RESPIRATORYC in the last 4320 hours.  Urine Culture: No  results for input(s): LABURIN in the last 4320 hours.  Urine Studies:   Recent Labs  Lab 03/20/18 2004   COLORU Yellow   APPEARANCEUA Clear   PHUR 5.0   SPECGRAV 1.010   PROTEINUA Negative   GLUCUA Negative   KETONESU Negative   BILIRUBINUA Negative   OCCULTUA Negative   NITRITE Negative   UROBILINOGEN Negative   LEUKOCYTESUR Negative     Wound Culture: No results for input(s): LABAERO in the last 4320 hours.    Significant Imaging: I have reviewed all pertinent imaging results/findings within the past 24 hours.

## 2018-03-22 NOTE — ASSESSMENT & PLAN NOTE
Grade 3 diastolic dysfuction on echo 3/21, EF 50%  Resume home torsemide 20mg for now, hemodynamically stable

## 2018-03-22 NOTE — ASSESSMENT & PLAN NOTE
Normocytic anemia, hemoglobin 9.2  Likely anemia of chronic inflammation perhaps with Fe deficient component  - B12, folate wnl

## 2018-03-22 NOTE — PROGRESS NOTES
"[facility] Hospital Medicine  Progress Note    Patient Name: Luis Belcher  MRN: [id number]  Patient Class: IP- Inpatient   Admission Date: 3/20/2018  Length of Stay: 2 days  Attending Physician: Kasie Osborne MD  Primary Care Provider: Lauar Arrieta DO    [facility] Medicine Team: [facility]OSP MED 4 Ileana Benjamin MD    Subjective:     Principal Problem:Sepsis    HPI:  Mr. Luis Belcher is an 87 year old male with HTN, DM 2, CHF (unknown type or EF ), AAA s/p endovascular repair in 2001, atrial fibrillation, CKD stage 3 , and prostate cancer s/p radical prostatectomy 20 years ago. He is being admitted for fever.    Mr. Belcher recently moved to [place] from [place] this past December and has thus far been treated at [facility]. No records are available at this time. He  has a long history of recurrent fevers, which first occurred about 7 years ago. He has been on and off suppressive therapy with antibiotics over this course, and most recently completed a six week course of antibiotics on 12/18/17, at which time he moved from [place] to [place]. Family does not know which antibiotic he was on. Patient's wife reports he was followed by an infectious disease doctor in [place], and his infection stemmed from "a pocket of infection by his aorta", and that he was not a surgical candidate.      Most recently, Mr. Belcher was admitted to [facility] on 3/06/18 for fever, chills, n/v, weakness, and shortness of breath. The paperwork they provide is limited, but his discharge diagnoses included CHF exacerbation, elevated lactic acid, elevated troponin, and afib. He was discharged home on a course of ciprofloxacin 250mg bid and linezolid 600mg bid (uncertain length of course). Wife reports compliance with medications.     Today, Mr. Belcher presented to the ED after this afternoon, he suddenly developed a fever of 103 F, and felt weak, with chills, nausea, and two episodes of nonbloody emesis. He " also complained of right-sided temporal headache, which he described as throbbing, but has since resolved. He was treated in the ED with a 30cc/kg bolus, acetaminophen, ibuprofen, and IV vancomycin and piperacillin/tazobactam. He now feels much more comfortable and has no complaints.         Hospital Course:  Patient admitted to hospital medicine. Giving IV vanc and rocephin for aortitis and intra-abdominal abscess. Vascular and ID following.   Continuing current therapy for now. Patient would not like surgical intervention; patient with COPD and is a poor surgical candidate anyway per vascular.    Past Medical History:   Diagnosis Date    AAA (abdominal aortic aneurysm)     Arthritis     Bloodstream infection     Cancer     prostste    Diabetes mellitus     Heart disease     Hypertension        Past Surgical History:   Procedure Laterality Date    ABDOMINAL AORTIC ANEURYSM REPAIR      COCHLEAR IMPLANT REVISION      PROSTATECTOMY         Review of patient's allergies indicates:  No Known Allergies    No current facility-administered medications on file prior to encounter.      Current Outpatient Prescriptions on File Prior to Encounter   Medication Sig    ezetimibe (ZETIA) 10 mg tablet     gabapentin (NEURONTIN) 300 MG capsule     montelukast (SINGULAIR) 10 mg tablet Take 10 mg by mouth every evening.    potassium chloride SA (K-DUR,KLOR-CON) 10 MEQ tablet Take 10 mEq by mouth 2 (two) times daily.    rOPINIRole (REQUIP) 0.5 MG tablet     sildenafil, antihypertensive, (REVATIO) 20 mg Tab     simvastatin (ZOCOR) 40 MG tablet Take 40 mg by mouth every evening.    torsemide (DEMADEX) 20 MG Tab Take 20 mg by mouth once daily.    UBIQUINONE ORAL Take by mouth.    amLODIPine (NORVASC) 5 MG tablet     atenolol (TENORMIN) 25 MG tablet Take 25 mg by mouth once daily.    calcium citrate-vitamin D3 315-200 mg (CITRACAL+D) 315-200 mg-unit per tablet Take 1 tablet by mouth 2 (two) times daily.    JANUVIA 25  mg Tab     lisinopril (PRINIVIL,ZESTRIL) 40 MG tablet     warfarin (COUMADIN) 2 MG tablet Take 2 mg by mouth once daily.     Family History     None        Social History Main Topics    Smoking status: Former Smoker    Smokeless tobacco: Never Used    Alcohol use No    Drug use: Unknown    Sexual activity: Not on file     Interval Hx: No acute events overnight.    Review of Systems   Constitutional: Negative for activity change, appetite change, chills, fever and unexpected weight change.   HENT: Negative for ear pain and rhinorrhea.    Eyes: Negative for pain and redness.   Respiratory: Negative for cough, shortness of breath and wheezing.    Cardiovascular: Negative for chest pain, palpitations and leg swelling.   Gastrointestinal: Negative for abdominal pain, constipation, diarrhea, nausea and vomiting.   Endocrine: Negative for polydipsia and polyuria.   Genitourinary: Negative for dysuria and hematuria.   Musculoskeletal: Negative for back pain and neck pain.   Skin: Negative for color change and rash.   Neurological: Negative for light-headedness and headaches.   Hematological: Negative for adenopathy. Does not bruise/bleed easily.   Psychiatric/Behavioral: Negative for confusion. The patient is not nervous/anxious.      Objective:     Vital Signs (Most Recent):  Temp: 97.4 °F (36.3 °C) (03/22/18 1601)  Pulse: 80 (03/22/18 1601)  Resp: 16 (03/22/18 1601)  BP: (!) 163/90 (03/22/18 1601)  SpO2: (!) 90 % (03/22/18 1601) Vital Signs (24h Range):  Temp:  [96 °F (35.6 °C)-98 °F (36.7 °C)] 97.4 °F (36.3 °C)  Pulse:  [57-80] 80  Resp:  [16-20] 16  SpO2:  [90 %-100 %] 90 %  BP: (153-167)/(66-90) 163/90     Weight: 83.9 kg (185 lb)  Body mass index is 28.98 kg/m².    Physical Exam   Constitutional: He is oriented to person, place, and time. He appears well-developed and well-nourished. No distress.   HENT:   Head: Normocephalic and atraumatic.   Mouth/Throat: Oropharynx is clear and moist.   Dental caries   Eyes:  "Conjunctivae and EOM are normal. No scleral icterus.   Neck: Normal range of motion. Neck supple. JVD present.   Cardiovascular: Normal rate.  Exam reveals no friction rub.    No murmur heard.  Irregular rhythm   Pulmonary/Chest: Effort normal. No respiratory distress. He has no wheezes. He has rales (few bibasilar).   Abdominal: Soft. Bowel sounds are normal. He exhibits no distension. There is no tenderness. There is no guarding.   Midline hernia at , soft and easily reducible   Musculoskeletal: Normal range of motion. He exhibits no edema or tenderness.   No back pain   Neurological: He is alert and oriented to person, place, and time.   Skin: Skin is warm and dry. No rash noted. He is not diaphoretic.   Psychiatric: He has a normal mood and affect. His behavior is normal.         CRANIAL NERVES     CN III, IV, VI   Extraocular motions are normal.        Significant Labs:   CBC:     Recent Labs  Lab 03/20/18 1923 03/21/18  0536 03/22/18  0606   WBC 13.00* 14.85* 9.80   HGB 9.2* 7.7* 7.7*   HCT 29.2* 25.0* 25.3*    187 171     CMP:     Recent Labs  Lab 03/20/18 1923 03/21/18  0536 03/22/18  0606    142 143   K 3.6 3.5 4.1   CL 98 105 107   CO2 28 29 26   * 115* 131*   BUN 35* 28* 22   CREATININE 1.7* 1.6* 1.3   CALCIUM 9.0 8.4* 9.1   PROT 7.5 6.4 6.4   ALBUMIN 3.1* 2.6* 2.6*   BILITOT 0.8 0.7 0.5   ALKPHOS 105 80 78   AST 18 16 14   ALT 14 12 11   ANIONGAP 14 8 10   EGFRNONAA 35.5* 38.2* 49.1*     Lipase:     Recent Labs  Lab 03/20/18 1923   LIPASE 35       Significant Imaging: I have reviewed all pertinent imaging results/findings within the past 24 hours.    Assessment/Plan:      * Sepsis    Patient presented with fever of 103.1 F, WBC 13, respiratory rate >20, and pulse >90 on admission  Lactic acid initially 2.4, downtrended to 1.4 after 30cc/kg bolus  Given reported history of "pocket of infection near aorta", infected aortic aneurysm is top of differential at this time, CTA abdomen and " "pelvis done revealing aortitis and para-iliac abscess  Intially treated with IV Vancomycin/Zosyn in the ED  - follow blood cultures  - continue empiric treatment with IV Vancomycin and Ceftriaxone  - Vascular surgery deems patient not a surgical candidate and patient prefers no intervention  - consult infectious disease, obtaining records from [place], will need lifelong abx  suppression          Sleep apnea    CPAP qhs: EPAP 4 per wife          Anemia    Normocytic anemia, hemoglobin 9.2  Likely anemia of chronic inflammation perhaps with Fe deficient component  - B12, folate wnl          Stage 3 chronic kidney disease    Secondary to DM 2  Stage three per [facility] discharge paperwork  Cr on admission 1.7  Renal function may be affected by CTA, however contrast necessary to evaluate for source of infection. Patient had already received fluid bolus in the ED  Crt improved to 1.3 3/22        Chronic atrial fibrillation    Currently rate controlled  Home med: apixaban 2.5mg bid, will continue        Chronic congestive heart failure    Grade 3 diastolic dysfuction on echo 3/21, EF 50%  Resume home torsemide 20mg for now, hemodynamically stable          Abdominal aortic aneurysm (AAA) without rupture    S/P endovascular repair in 2001  - see "sepsis"        Type 2 diabetes mellitus with renal complication    No diabetes medications on home med list, however used to take Januvia  - POCT glucose ac+hs  - SSI  - diabetic diet  - hypoglycemic protocol        Prostate cancer    History of prostate cancer s/p radical prostatectomy in 2001  However, per wife, patient has had "rising PSAs over the past few months". She reports that this was being monitored by urologist in [place]          Hypertension    No antihypertensives on home med list, however did take antihypertensives in the past          VTE Risk Mitigation         Ordered     apixaban tablet 2.5 mg  2 times daily     Route:  Oral        03/21/18 Jeana Tejeda " STEPHANIE Benjamin MD  Department of Hospital Medicine   [facility]

## 2018-03-22 NOTE — SUBJECTIVE & OBJECTIVE
Past Medical History:   Diagnosis Date    AAA (abdominal aortic aneurysm)     Arthritis     Bloodstream infection     Cancer     prostste    Diabetes mellitus     Heart disease     Hypertension        Past Surgical History:   Procedure Laterality Date    ABDOMINAL AORTIC ANEURYSM REPAIR      COCHLEAR IMPLANT REVISION      PROSTATECTOMY         Review of patient's allergies indicates:  No Known Allergies    No current facility-administered medications on file prior to encounter.      Current Outpatient Prescriptions on File Prior to Encounter   Medication Sig    ezetimibe (ZETIA) 10 mg tablet     gabapentin (NEURONTIN) 300 MG capsule     montelukast (SINGULAIR) 10 mg tablet Take 10 mg by mouth every evening.    potassium chloride SA (K-DUR,KLOR-CON) 10 MEQ tablet Take 10 mEq by mouth 2 (two) times daily.    rOPINIRole (REQUIP) 0.5 MG tablet     sildenafil, antihypertensive, (REVATIO) 20 mg Tab     simvastatin (ZOCOR) 40 MG tablet Take 40 mg by mouth every evening.    torsemide (DEMADEX) 20 MG Tab Take 20 mg by mouth once daily.    UBIQUINONE ORAL Take by mouth.    amLODIPine (NORVASC) 5 MG tablet     atenolol (TENORMIN) 25 MG tablet Take 25 mg by mouth once daily.    calcium citrate-vitamin D3 315-200 mg (CITRACAL+D) 315-200 mg-unit per tablet Take 1 tablet by mouth 2 (two) times daily.    JANUVIA 25 mg Tab     lisinopril (PRINIVIL,ZESTRIL) 40 MG tablet     warfarin (COUMADIN) 2 MG tablet Take 2 mg by mouth once daily.     Family History     None        Social History Main Topics    Smoking status: Former Smoker    Smokeless tobacco: Never Used    Alcohol use No    Drug use: Unknown    Sexual activity: Not on file     Interval Hx: No acute events overnight.    Review of Systems   Constitutional: Negative for activity change, appetite change, chills, fever and unexpected weight change.   HENT: Negative for ear pain and rhinorrhea.    Eyes: Negative for pain and redness.   Respiratory:  Negative for cough, shortness of breath and wheezing.    Cardiovascular: Negative for chest pain, palpitations and leg swelling.   Gastrointestinal: Negative for abdominal pain, constipation, diarrhea, nausea and vomiting.   Endocrine: Negative for polydipsia and polyuria.   Genitourinary: Negative for dysuria and hematuria.   Musculoskeletal: Negative for back pain and neck pain.   Skin: Negative for color change and rash.   Neurological: Negative for light-headedness and headaches.   Hematological: Negative for adenopathy. Does not bruise/bleed easily.   Psychiatric/Behavioral: Negative for confusion. The patient is not nervous/anxious.      Objective:     Vital Signs (Most Recent):  Temp: 97.4 °F (36.3 °C) (03/22/18 1601)  Pulse: 80 (03/22/18 1601)  Resp: 16 (03/22/18 1601)  BP: (!) 163/90 (03/22/18 1601)  SpO2: (!) 90 % (03/22/18 1601) Vital Signs (24h Range):  Temp:  [96 °F (35.6 °C)-98 °F (36.7 °C)] 97.4 °F (36.3 °C)  Pulse:  [57-80] 80  Resp:  [16-20] 16  SpO2:  [90 %-100 %] 90 %  BP: (153-167)/(66-90) 163/90     Weight: 83.9 kg (185 lb)  Body mass index is 28.98 kg/m².    Physical Exam   Constitutional: He is oriented to person, place, and time. He appears well-developed and well-nourished. No distress.   HENT:   Head: Normocephalic and atraumatic.   Mouth/Throat: Oropharynx is clear and moist.   Dental caries   Eyes: Conjunctivae and EOM are normal. No scleral icterus.   Neck: Normal range of motion. Neck supple. JVD present.   Cardiovascular: Normal rate.  Exam reveals no friction rub.    No murmur heard.  Irregular rhythm   Pulmonary/Chest: Effort normal. No respiratory distress. He has no wheezes. He has rales (few bibasilar).   Abdominal: Soft. Bowel sounds are normal. He exhibits no distension. There is no tenderness. There is no guarding.   Midline hernia at , soft and easily reducible   Musculoskeletal: Normal range of motion. He exhibits no edema or tenderness.   No back pain   Neurological: He is  alert and oriented to person, place, and time.   Skin: Skin is warm and dry. No rash noted. He is not diaphoretic.   Psychiatric: He has a normal mood and affect. His behavior is normal.         CRANIAL NERVES     CN III, IV, VI   Extraocular motions are normal.        Significant Labs:   CBC:     Recent Labs  Lab 03/20/18 1923 03/21/18  0536 03/22/18  0606   WBC 13.00* 14.85* 9.80   HGB 9.2* 7.7* 7.7*   HCT 29.2* 25.0* 25.3*    187 171     CMP:     Recent Labs  Lab 03/20/18 1923 03/21/18  0536 03/22/18  0606    142 143   K 3.6 3.5 4.1   CL 98 105 107   CO2 28 29 26   * 115* 131*   BUN 35* 28* 22   CREATININE 1.7* 1.6* 1.3   CALCIUM 9.0 8.4* 9.1   PROT 7.5 6.4 6.4   ALBUMIN 3.1* 2.6* 2.6*   BILITOT 0.8 0.7 0.5   ALKPHOS 105 80 78   AST 18 16 14   ALT 14 12 11   ANIONGAP 14 8 10   EGFRNONAA 35.5* 38.2* 49.1*     Lipase:     Recent Labs  Lab 03/20/18 1923   LIPASE 35       Significant Imaging: I have reviewed all pertinent imaging results/findings within the past 24 hours.

## 2018-03-22 NOTE — ASSESSMENT & PLAN NOTE
"Patient presented with fever of 103.1 F, WBC 13, respiratory rate >20, and pulse >90 on admission  Lactic acid initially 2.4, downtrended to 1.4 after 30cc/kg bolus  Given reported history of "pocket of infection near aorta", infected aortic aneurysm is top of differential at this time, CTA abdomen and pelvis done revealing aortitis and para-iliac abscess  Intially treated with IV Vancomycin/Zosyn in the ED  - follow blood cultures  - continue empiric treatment with IV Vancomycin and Ceftriaxone  - Vascular surgery deems patient not a surgical candidate and patient prefers no intervention  - consult infectious disease, obtaining records from [place], will need lifelong abx  suppression    "

## 2018-03-22 NOTE — ASSESSMENT & PLAN NOTE
Secondary to DM 2  Stage three per [facility] discharge paperwork  Cr on admission 1.7  Renal function may be affected by CTA, however contrast necessary to evaluate for source of infection. Patient had already received fluid bolus in the ED  Crt improved to 1.3 3/22

## 2018-03-22 NOTE — HOSPITAL COURSE
Patient admitted to hospital medicine. Giving IV vanc and rocephin for aortitis and intra-abdominal abscess. Vascular and ID following.   Continuing current therapy for now. Patient would not like surgical intervention; patient with COPD and is a poor surgical candidate anyway per vascular. Patient became SOB 2/22 requiring 2L NC received IV lasix x 1 40mg with improvement. Continued on home torsemide 20mg. Patient on room air with no increased WOB on day of discharge.

## 2018-03-22 NOTE — CONSULTS
"Luis Belcher  03/21/2018    Vascular Surgery Consult Note    CC: Hx of EVAR for AAA, suspected graft infection    HPI:  Patient is a 87 y.o. male with a h/o HTN, DM 2, CHF (unknown type or EF ), AAA s/p endovascular repair in 2001, atrial fibrillation (on Eliquis), CKD stage 3, prostate cancer s/p radical prostatectomy 20 years ago.    Mr. Belcher recently moved to [place] from [place] this past December and has thus far been treated at [facility]. No records are available at this time. He  has a long history of recurrent fevers, which first occurred about 7 years ago. 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 He has been on and off suppressive therapy with antibiotics over this course, and most recently completed a six week course of antibiotics on 12/18/17, at which time he moved from [place] to [place]. Family does not know which antibiotic he was on. Patient's wife reports he was followed by an infectious disease doctor in [place], and his infection stemmed from "a pocket of infection by his aorta", and that he was not a surgical candidate.       Most recently, Mr. Belcher was admitted to [facility] on 3/06/18 for fever, chills, n/v, weakness, and shortness of breath. The paperwork they provide is limited, but his discharge diagnoses included CHF exacerbation, elevated lactic acid, elevated troponin, and afib. He was discharged home on a course of ciprofloxacin 250mg bid and linezolid 600mg bid (uncertain length of course).      He presented to OSH ED yesterday with fever up to 103 with associated malasia, chills, and emesis. He was transferred to [facility] for further evaluation and treatment. He was admitted and started on broad spectrum Abx. Vascular surgery was consulted to evaluate patient.     Of note, he is mostly bedbound. He can move from bed to chair and to bathroom using his cane but does not leave his house due to his overall " deconditioned state. He has been sleeping in his recliner for past 7 years and c/o SOB if he lays flat on his back. At current time he has no desire to undergo major operation in fear of not surviving through the operation.     Past Medical History:   Diagnosis Date    AAA (abdominal aortic aneurysm)     Arthritis     Bloodstream infection     Cancer     prostste    Diabetes mellitus     Heart disease     Hypertension      Past Surgical History:   Procedure Laterality Date    ABDOMINAL AORTIC ANEURYSM REPAIR      COCHLEAR IMPLANT REVISION      PROSTATECTOMY       History reviewed. No pertinent family history.  Social History     Social History    Marital status:      Spouse name: N/A    Number of children: N/A    Years of education: N/A     Occupational History    Not on file.     Social History Main Topics    Smoking status: Former Smoker    Smokeless tobacco: Never Used    Alcohol use No    Drug use: Unknown    Sexual activity: Not on file     Other Topics Concern    Not on file     Social History Narrative    No narrative on file     Review of patient's allergies indicates:  No Known Allergies    Current Facility-Administered Medications:     acetaminophen tablet 650 mg, 650 mg, Oral, Q8H PRN, Gabbie Leblanc DO    apixaban tablet 2.5 mg, 2.5 mg, Oral, BID, Chrissie Restrepo MD    calcium carbonate 200 mg calcium (500 mg) chewable tablet 500 mg, 500 mg, Oral, TID PRN, Chrissie Restrepo MD    cefTRIAXone (ROCEPHIN) 2 g in dextrose 5 % 50 mL IVPB, 2 g, Intravenous, Q24H, Gabbie Leblanc DO, 2 g at 03/21/18 0613    dextrose 50% injection 12.5 g, 12.5 g, Intravenous, PRN, Gabbie Leblanc DO    dextrose 50% injection 25 g, 25 g, Intravenous, PRN, Gabbie Leblanc DO    ezetimibe tablet 10 mg, 10 mg, Oral, Daily, Gabbie Leblanc DO, Stopped at 03/21/18 0900    gabapentin capsule 300 mg, 300 mg, Oral, QHS, Gabbie Leblanc DO    glucagon (human  recombinant) injection 1 mg, 1 mg, Intramuscular, PRN, Gabbie Leblanc,     glucose chewable tablet 16 g, 16 g, Oral, PRN, Gabbie Leblanc,     glucose chewable tablet 24 g, 24 g, Oral, PRN, Gabbie Leblanc,     insulin aspart U-100 pen 0-5 Units, 0-5 Units, Subcutaneous, QID (AC + HS) PRN, Gabbie Leblanc,     montelukast tablet 10 mg, 10 mg, Oral, QHS, Gabbie Leblanc,     ondansetron disintegrating tablet 8 mg, 8 mg, Oral, Q8H PRN, Gabbie Leblanc DO    ondansetron injection 4 mg, 4 mg, Intravenous, Q8H PRN, Gabbie Leblanc DO    polyethylene glycol packet 17 g, 17 g, Oral, BID PRN, Chrissie Restrepo MD    potassium chloride CR capsule 10 mEq, 10 mEq, Oral, BID, Gabbie Leblanc DO, Stopped at 03/21/18 0900    ramelteon tablet 8 mg, 8 mg, Oral, Nightly PRN, Gabbie Leblanc DO    rOPINIRole tablet 0.5 mg, 0.5 mg, Oral, QHS, Gabbie Leblanc,     simvastatin tablet 40 mg, 40 mg, Oral, QHS, Gabbie Leblanc,     sodium chloride 0.9% flush 5 mL, 5 mL, Intravenous, PRN, Gabbie Leblanc,     vancomycin (VANCOCIN) 1,250 mg in sodium chloride 0.9% 250 mL IVPB, 15 mg/kg, Intravenous, Q24H, Gabbie Leblanc DO    REVIEW OF SYSTEMS:  General: negative;   ENT: negative;   Allergy and Immunology: negative;   Hematological and Lymphatic: Negative;   Endocrine: negative;   Respiratory: no cough, shortness of breath, or wheezing;   Cardiovascular: no chest pain or dyspnea on exertion;   Gastrointestinal: no abdominal pain/back, change in bowel habits, or bloody stools;   Genito-Urinary: no dysuria, trouble voiding, or hematuria;   Musculoskeletal: negative  Neurological: no TIA or stroke symptoms;   Psychiatric: no nervousness, anxiety or depression.    PHYSICAL EXAM:      Pulse: 72  Temp: 97.4 °F (36.3 °C)      General appearance:  Alert, well-appearing, and in no distress.  Oriented to person, place, and  time   Neurological:  Normal speech, no focal findings noted; CN II - XII grossly intact           Musculoskeletal: Digits/nail without cyanosis/clubbing.  Normal muscle strength/tone.                 Neck: Supple, no significant adenopathy; thyroid is not enlarged                Chest:  Clear to auscultation, no wheezes, rales or rhonchi, symmetric air entry      No use of accessory muscles             Cardiac: Normal rate and regular rhythm, S1 and S2 normal; PMI non-displaced          Abdomen: Soft, nontender, nondistended, no masses or organomegaly      No rebound tenderness noted; bowel sounds normal      Pulsatile aortic mass is not palpable.       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  Graft involvement cannot be excluded.  A small hypodense focus anterior to the left common iliac graft measuring 2.3 x 2.0 cm which could represent phlegmon or abscess.  No evidence endoleak.  There is a filling defect along the anterior aspect of the graft extending to the right common iliac which could represent noncalcified plaque or thrombus.  Recommend correlation with operative findings and prior imaging.    Fusiform aneurysmal dilatation of the descending thoracic and abdominal aorta measuring up to 3.2 and 4.6 cm  respectively.    Atrophic left kidney and bilateral renal hypodensities suggestive of renal cysts.    Right hepatic lobe hypodensity, too small to characterize.    Multiple ventral abdominal wall hernias containing mesenteric fat.    Cholelithiasis.     IMP/PLAN:  87 y.o. male with  HTN, DM 2, CHF (unknown type or EF ), atrial fibrillation (on Eliquis), CKD stage 3 and AAA s/p endovascular repair in 2001 with what sounds like known graft infection that has been treated with suppressive Abx therapy for past 7~8 years. He has been taken off of suppressive Abx therapy for reasons unclear to the patient himself. He has been having fevers and sepsis likely from being off of the suppressive Abx therapy that was recently treated in [facility].     His been told he is not a surgical candidate but he is unable to tell me details. No record are available re: his cardiopulmonary comorbidities. It does appear that he is fairly debilitated and spends most of his day in bed or on couch. He also does not wish any major operation.     Will follow along and once more of his medical records are available from [facility], make a better informed decision as to if he is truly a poor surgical candidate or not. Also, we will talk to patient when family is around to further discuss his clear wishes for his medical care going forward.     Fercho Patel MD  Vascular/Endovascular Surgery Fellow    Vascular Surgery Staff  I have reviewed the patients history, physical exam, pertinent labs and imaging. I agree with the management plan as outlined in the resident note.    Severe COPD and not a surgical candidate.  Continue suppressive antibiotics.    Lizzy Berumen MD FACS [facility]  Vascular & Endovascular Surgery

## 2018-03-22 NOTE — ASSESSMENT & PLAN NOTE
87 year old male with HTN, DM 2, CHF (unknown type or EF ), AAA s/p endovascular repair in 2001, atrial fibrillation, CKD stage 3 , and prostate cancer s/p radical prostatectomy 20 years ago admitted with fevers (up to 103), chills, nausea.  Reported long history of IV antibiotics and antibiotic suppression for recurrent fevers X approx. 7 years for chronic aortic graft infection.  Previously followed by ID in [place]. Reportedly told he had a pocket of infection by the graft and was not a surgical candidate.   Recently completed 6 week course of antibiotics in December prior to moving to [place].  Had been off antibiotics since. He has since been admitted to [facility] on 3/6/18 for fever, chills, n/v, weakness, SOB and wet cough. He was treated with ciprofloxacin and linezolid while inpatient for pneumonia. Blood cultures 3/8 and 3/12 were negative and patient was discharged off antibiotic therapy (? 3/11).      Shortly after discharge patient developed fevers, rigors and N/V at home and is now admitted to [facility] for further evaluation. Patient is currently on Vancomycin and Ceftriaxone. CRP 54.8. ESR 80. WBC 14.85. Blood cultures 3/20 NGTD.  2D echo negative for vegetation.     CT chest/abd here is concerning for aortitis involving the infrarenal abd aorta at the level of the graft bifurcation to the common iliacs and possible abscess 2.3 X 2.0 anterior to the left common iliac graft. Vascular Surgery is following.      Since starting antibiotics fevers and leukocytosis have resolved. Patient hemodynamically stable. Symptomatically much improved without current complaints.        Plan  - Continue IV Vancomycin and Ceftriaxone for now  - Vanc trough before 3rd dose (ordered).  - Will follow blood cultures to ensure sterile and adjust abx accordingly  - Awaiting [place] ID physician records - Authorization sent to Dr. Azul Shoemaker in [place]. Will review.   -  Will follow up tomorrow with final  recommendations after reviewing OSH records. He will need lifelong suppressive antibiotic therapy.

## 2018-03-22 NOTE — PLAN OF CARE
Problem: Patient Care Overview  Goal: Plan of Care Review  Outcome: Ongoing (interventions implemented as appropriate)  Pt AAOx4, VSS, No falls noted, fall precautions remain. Skin intact, Pain assessed, no pain noted, pain controlled with PRN regimen, pt comfortable, frequent rounds made for safety and patient care. SCD's in place. Call light within reach, bed wheels locked, bed in lowest position, side rails ^x2, safety maintained. NADN, Will continue monitor.

## 2018-03-22 NOTE — ASSESSMENT & PLAN NOTE
"History of prostate cancer s/p radical prostatectomy in 2001  However, per wife, patient has had "rising PSAs over the past few months". She reports that this was being monitored by urologist in [place]    "

## 2018-03-22 NOTE — PROGRESS NOTES
[facility]  Infectious Disease  Progress Note    Patient Name: Luis Belcher  MRN: [id number]  Admission Date: 3/20/2018  Length of Stay: 2 days  Attending Physician: Kasie Osborne MD  Primary Care Provider: Laura Arrieta DO    Isolation Status: No active isolations  Assessment/Plan:      * Sepsis       87 year old male with HTN, DM 2, CHF (unknown type or EF ), AAA s/p endovascular repair in 2001, atrial fibrillation, CKD stage 3 , and prostate cancer s/p radical prostatectomy 20 years ago admitted with fevers (up to 103), chills, nausea.  Reported long history of IV antibiotics and antibiotic suppression for recurrent fevers X approx. 7 years for chronic aortic graft infection.  Previously followed by ID in [place]. Reportedly told he had a pocket of infection by the graft and was not a surgical candidate.   Recently completed 6 week course of antibiotics in December prior to moving to [place].  Had been off antibiotics since. He has since been admitted to [facility] on 3/6/18 for fever, chills, n/v, weakness, SOB and wet cough. He was treated with ciprofloxacin and linezolid while inpatient for pneumonia. Blood cultures 3/8 and 3/12 were negative and patient was discharged on Ciprofloxacin.      Shortly after discharge patient developed fevers, rigors and N/V at home and is now admitted to [facility] for further evaluation. Patient is currently on Vancomycin and Ceftriaxone. CRP 54.8. ESR 80. WBC 14.85. Blood cultures 3/20 NGTD.  2D echo negative for vegetation.     CT chest/abd here is concerning for aortitis involving the infrarenal abd aorta at the level of the graft bifurcation to the common iliacs and possible abscess 2.3 X 2.0 anterior to the left common iliac graft. Vascular Surgery is following. Patient not a surgical candidate.     Since starting antibiotics fevers and leukocytosis have resolved. Patient hemodynamically stable. Symptomatically much improved without current complaints.   "      Plan  - Continue IV Vancomycin and Ceftriaxone for now  - Vanc trough before 3rd dose (ordered).  - Will follow blood cultures to ensure sterile and adjust abx accordingly  - [facility] records - Authorization sent to Dr. Azul Shoemaker in [place]. Will review.   - Discussed case with Dr. Sorto. [place] over medical records from referral. 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   -  Will follow up tomorrow with final recommendations after reviewing OSH records. He will need lifelong suppressive antibiotic therapy.            Please call for any questions. Thank you.  Alka Piña PA-C  Phone: [phone]  Pager: [phone]    Subjective:     Principal Problem:Sepsis    HPI:   Mr. Belcher is an 87 year old male with HTN, DM 2, CHF (unknown type or EF ), AAA s/p endovascular repair in 2001, atrial fibrillation, CKD stage 3 , and prostate cancer s/p radical prostatectomy 20 years ago. He is being admitted for fevers, chills, n/v.  He recently moved from [place] to [place] in December. He has a long history of recurrent fevers, which first occurred about 7 years ago. He has been on and off suppressive therapy with antibiotics over this course, and most recently completed a six week course of antibiotics on 12/18/17. Family does not know which antibiotic he was on. Patient's wife reports he was followed by an infectious disease doctor in [place], and his infection stemmed from "a pocket of infection by his aorta" (dx on nuclear medicine scan) and that he was not a surgical candidate.       Most recently, Mr. Belcher was admitted to [facility] on 3/06/18 for fever, chills, n/v, weakness, shortness of breath and wet cough. The paperwork they provide is limited, but his discharge diagnoses included CHF exacerbation, elevated lactic acid, elevated troponin, and afib. He was hospitalized until 3/11/18. During his stay he was treated with ciprofloxacin 250mg bid and linezolid 600mg bid " "for pneumonia and discharged off antibiotics. Of note, he had been off antibiotics prior to that admission since December. Blood cultures on 3/8 and 3/12 were NGTD at 5 days.      On 3/20, Mr. Belcher presented to the ED with sudden onset of fever of 103 F, weakness, chills,  nausea, and two episodes of nonbloody emesis. He also complained of right-sided temporal headache, which he described as throbbing, but has since resolved. He was treated in the ED with a 30cc/kg bolus, acetaminophen, ibuprofen, and IV vancomycin and piperacillin/tazobactam.  ID is consulted for recommendations.       Blood cultures 3/20  NGTD    CTA chest/Abdomen - c/f aortitis involving the infrarenal abd aorta at the level of the graft bifurcation to the common iliacs, graft involvement cannot be excluded.  Possible abscess 2.3 X 2.0 anterior to the left common iliac graft.     2D Echo - negative for vegetation     WBC 14.85  CRP 54.88    Patient reports he follows with Dr. Sorto at  Infectious Disease  ID doctor in [place] - Dr. Azul Shoemaker     At time of visit patient is afebrile and reports he "feels great".  Denies chills, sweats.  No further n/v.  Good appetite. Denies any pain.   No culture data as yet from [place].  Past Medical History:   Diagnosis Date    AAA (abdominal aortic aneurysm)     Arthritis     Bloodstream infection     Cancer     prostste    Diabetes mellitus     Heart disease     Hypertension        Past Surgical History:   Procedure Laterality Date    ABDOMINAL AORTIC ANEURYSM REPAIR      COCHLEAR IMPLANT REVISION      PROSTATECTOMY         Review of patient's allergies indicates:  No Known Allergies    Medications:  Prescriptions Prior to Admission   Medication Sig    apixaban (ELIQUIS) 2.5 mg Tab Take by mouth 2 (two) times daily.    ezetimibe (ZETIA) 10 mg tablet     gabapentin (NEURONTIN) 300 MG capsule     montelukast (SINGULAIR) 10 mg tablet Take 10 mg by mouth every evening.    potassium " chloride SA (K-DUR,KLOR-CON) 10 MEQ tablet Take 10 mEq by mouth 2 (two) times daily.    rOPINIRole (REQUIP) 0.5 MG tablet     sildenafil, antihypertensive, (REVATIO) 20 mg Tab     simvastatin (ZOCOR) 40 MG tablet Take 40 mg by mouth every evening.    torsemide (DEMADEX) 20 MG Tab Take 20 mg by mouth once daily.    UBIQUINONE ORAL Take by mouth.    amLODIPine (NORVASC) 5 MG tablet     atenolol (TENORMIN) 25 MG tablet Take 25 mg by mouth once daily.    calcium citrate-vitamin D3 315-200 mg (CITRACAL+D) 315-200 mg-unit per tablet Take 1 tablet by mouth 2 (two) times daily.    JANUVIA 25 mg Tab     lisinopril (PRINIVIL,ZESTRIL) 40 MG tablet     warfarin (COUMADIN) 2 MG tablet Take 2 mg by mouth once daily.     Antibiotics     Start     Stop Route Frequency Ordered    03/21/18 2100  vancomycin (VANCOCIN) 1,250 mg in sodium chloride 0.9% 250 mL IVPB  (Vancomycin IVPB with levels panel)      -- IV Every 24 hours (non-standard times) 03/21/18 0025    03/21/18 0700  cefTRIAXone (ROCEPHIN) 2 g in dextrose 5 % 50 mL IVPB      -- IV Every 24 hours (non-standard times) 03/21/18 0025        Antifungals     None        Antivirals     None             There is no immunization history on file for this patient.    Family History     None        Social History     Social History    Marital status:      Spouse name: N/A    Number of children: N/A    Years of education: N/A     Social History Main Topics    Smoking status: Former Smoker    Smokeless tobacco: Never Used    Alcohol use No    Drug use: Unknown    Sexual activity: Not Asked     Other Topics Concern    None     Social History Narrative    None     Review of Systems   Constitutional: Positive for chills (resolved) and fever (resolved). Negative for activity change, appetite change and fatigue.   HENT: Negative for ear pain and rhinorrhea.    Eyes: Negative for pain and redness.   Respiratory: Negative for cough and shortness of breath.     Cardiovascular: Negative for chest pain and leg swelling.   Gastrointestinal: Positive for nausea (on admission - resolved). Negative for abdominal pain, diarrhea and vomiting.   Genitourinary: Positive for frequency. Negative for dysuria and hematuria.   Musculoskeletal: Negative for back pain and neck pain.   Skin: Negative for color change and rash.   Neurological: Negative for light-headedness and headaches.   Hematological: Negative for adenopathy. Does not bruise/bleed easily.   Psychiatric/Behavioral: Negative for confusion. The patient is not nervous/anxious.      Objective:     Vital Signs (Most Recent):  Temp: 97.8 °F (36.6 °C) (03/22/18 1145)  Pulse: 66 (03/22/18 1145)  Resp: 20 (03/22/18 1145)  BP: (!) 153/66 (03/22/18 1145)  SpO2: 100 % (03/22/18 1145) Vital Signs (24h Range):  Temp:  [96 °F (35.6 °C)-98 °F (36.7 °C)] 97.8 °F (36.6 °C)  Pulse:  [57-76] 66  Resp:  [16-20] 20  SpO2:  [98 %-100 %] 100 %  BP: (142-167)/(63-76) 153/66     Weight: 83.9 kg (185 lb)  Body mass index is 28.98 kg/m².    Estimated Creatinine Clearance: 41.4 mL/min (based on SCr of 1.3 mg/dL).    Physical Exam   Constitutional: He is oriented to person, place, and time. He appears well-developed and well-nourished. No distress.   HENT:   Head: Normocephalic and atraumatic.   Eyes: Conjunctivae are normal. No scleral icterus.   Cardiovascular: Normal rate and normal heart sounds.  An irregularly irregular rhythm present. Exam reveals no friction rub.    No murmur heard.  Pulmonary/Chest: Effort normal and breath sounds normal. No respiratory distress. He has no wheezes. He has no rales.   Abdominal: Soft. Bowel sounds are normal. He exhibits no distension. There is no tenderness. There is no guarding.   Prior surgical scar noted.  Ventral hernia.   Musculoskeletal: He exhibits no edema or tenderness.   Neurological: He is alert and oriented to person, place, and time.   Skin: Skin is warm and dry. No rash noted. He is not  diaphoretic.   Psychiatric: He has a normal mood and affect. His behavior is normal.       Significant Labs:   Blood Culture:   Recent Labs  Lab 03/20/18  1938   LABBLOO No Growth to date  No Growth to date  No Growth to date  No Growth to date     CBC:   Recent Labs  Lab 03/20/18 1923 03/21/18  0536 03/22/18  0606   WBC 13.00* 14.85* 9.80   HGB 9.2* 7.7* 7.7*   HCT 29.2* 25.0* 25.3*    187 171     CMP:   Recent Labs  Lab 03/20/18 1923 03/21/18  0536 03/22/18  0606    142 143   K 3.6 3.5 4.1   CL 98 105 107   CO2 28 29 26   * 115* 131*   BUN 35* 28* 22   CREATININE 1.7* 1.6* 1.3   CALCIUM 9.0 8.4* 9.1   PROT 7.5 6.4 6.4   ALBUMIN 3.1* 2.6* 2.6*   BILITOT 0.8 0.7 0.5   ALKPHOS 105 80 78   AST 18 16 14   ALT 14 12 11   ANIONGAP 14 8 10   EGFRNONAA 35.5* 38.2* 49.1*     HIV 1/2 Antibodies: No results for input(s): [id number] in the last 48 hours.  HIV Rapid: No results for input(s): HIVRAPID in the last 48 hours.  Procalcitonin: No results for input(s): PROCAL in the last 48 hours.  Quantiferon: No results for input(s): NIL, TBAG, TBAGNIL, MITOGENNIL, TBGOLD in the last 48 hours.  Respiratory Culture: No results for input(s): GSRESP, RESPIRATORYC in the last 4320 hours.  Urine Culture: No results for input(s): LABURIN in the last 4320 hours.  Urine Studies:   Recent Labs  Lab 03/20/18 2004   COLORU Yellow   APPEARANCEUA Clear   PHUR 5.0   SPECGRAV 1.010   PROTEINUA Negative   GLUCUA Negative   KETONESU Negative   BILIRUBINUA Negative   OCCULTUA Negative   NITRITE Negative   UROBILINOGEN Negative   LEUKOCYTESUR Negative     Wound Culture: No results for input(s): LABAERO in the last 4320 hours.    Significant Imaging: I have reviewed all pertinent imaging results/findings within the past 24 hours.

## 2018-03-23 LAB
ALBUMIN SERPL BCP-MCNC: 2.5 G/DL
ALP SERPL-CCNC: 100 U/L
ALT SERPL W/O P-5'-P-CCNC: 12 U/L
ANION GAP SERPL CALC-SCNC: 9 MMOL/L
AST SERPL-CCNC: 16 U/L
BASOPHILS # BLD AUTO: 0.02 K/UL
BASOPHILS NFR BLD: 0.2 %
BILIRUB SERPL-MCNC: 0.6 MG/DL
BUN SERPL-MCNC: 22 MG/DL
CALCIUM SERPL-MCNC: 8.2 MG/DL
CHLORIDE SERPL-SCNC: 106 MMOL/L
CO2 SERPL-SCNC: 26 MMOL/L
CREAT SERPL-MCNC: 1.2 MG/DL
DIFFERENTIAL METHOD: ABNORMAL
EOSINOPHIL # BLD AUTO: 0.2 K/UL
EOSINOPHIL NFR BLD: 1.7 %
ERYTHROCYTE [DISTWIDTH] IN BLOOD BY AUTOMATED COUNT: 15.7 %
EST. GFR  (AFRICAN AMERICAN): >60 ML/MIN/1.73 M^2
EST. GFR  (NON AFRICAN AMERICAN): 54 ML/MIN/1.73 M^2
GLUCOSE SERPL-MCNC: 120 MG/DL
HCT VFR BLD AUTO: 24.7 %
HGB BLD-MCNC: 7.6 G/DL
IMM GRANULOCYTES # BLD AUTO: 0.05 K/UL
IMM GRANULOCYTES NFR BLD AUTO: 0.6 %
LYMPHOCYTES # BLD AUTO: 0.8 K/UL
LYMPHOCYTES NFR BLD: 9 %
MAGNESIUM SERPL-MCNC: 1.8 MG/DL
MCH RBC QN AUTO: 27.9 PG
MCHC RBC AUTO-ENTMCNC: 30.8 G/DL
MCV RBC AUTO: 91 FL
MONOCYTES # BLD AUTO: 1 K/UL
MONOCYTES NFR BLD: 11.2 %
NEUTROPHILS # BLD AUTO: 6.7 K/UL
NEUTROPHILS NFR BLD: 77.3 %
NRBC BLD-RTO: 0 /100 WBC
PHOSPHATE SERPL-MCNC: 2.7 MG/DL
PLATELET # BLD AUTO: 197 K/UL
PMV BLD AUTO: 11 FL
POCT GLUCOSE: 109 MG/DL (ref 70–110)
POCT GLUCOSE: 194 MG/DL (ref 70–110)
POTASSIUM SERPL-SCNC: 3.7 MMOL/L
PROT SERPL-MCNC: 6.4 G/DL
RBC # BLD AUTO: 2.72 M/UL
SODIUM SERPL-SCNC: 141 MMOL/L
WBC # BLD AUTO: 8.69 K/UL

## 2018-03-23 PROCEDURE — 97161 PT EVAL LOW COMPLEX 20 MIN: CPT

## 2018-03-23 PROCEDURE — 63600175 PHARM REV CODE 636 W HCPCS: Performed by: INTERNAL MEDICINE

## 2018-03-23 PROCEDURE — 63600175 PHARM REV CODE 636 W HCPCS: Performed by: STUDENT IN AN ORGANIZED HEALTH CARE EDUCATION/TRAINING PROGRAM

## 2018-03-23 PROCEDURE — 83735 ASSAY OF MAGNESIUM: CPT

## 2018-03-23 PROCEDURE — 84100 ASSAY OF PHOSPHORUS: CPT

## 2018-03-23 PROCEDURE — 97530 THERAPEUTIC ACTIVITIES: CPT

## 2018-03-23 PROCEDURE — 80053 COMPREHEN METABOLIC PANEL: CPT

## 2018-03-23 PROCEDURE — 99900035 HC TECH TIME PER 15 MIN (STAT)

## 2018-03-23 PROCEDURE — 36415 COLL VENOUS BLD VENIPUNCTURE: CPT

## 2018-03-23 PROCEDURE — G8979 MOBILITY GOAL STATUS: HCPCS | Mod: CJ

## 2018-03-23 PROCEDURE — G8978 MOBILITY CURRENT STATUS: HCPCS | Mod: CK

## 2018-03-23 PROCEDURE — 25000003 PHARM REV CODE 250: Performed by: STUDENT IN AN ORGANIZED HEALTH CARE EDUCATION/TRAINING PROGRAM

## 2018-03-23 PROCEDURE — 25000003 PHARM REV CODE 250: Performed by: INTERNAL MEDICINE

## 2018-03-23 PROCEDURE — 99232 SBSQ HOSP IP/OBS MODERATE 35: CPT | Mod: ,,, | Performed by: HOSPITALIST

## 2018-03-23 PROCEDURE — 99233 SBSQ HOSP IP/OBS HIGH 50: CPT | Mod: ,,, | Performed by: PHYSICIAN ASSISTANT

## 2018-03-23 PROCEDURE — 94660 CPAP INITIATION&MGMT: CPT

## 2018-03-23 PROCEDURE — 11000001 HC ACUTE MED/SURG PRIVATE ROOM

## 2018-03-23 PROCEDURE — 85025 COMPLETE CBC W/AUTO DIFF WBC: CPT

## 2018-03-23 RX ORDER — VANCOMYCIN/0.9 % SOD CHLORIDE 1 G/100 ML
1000 PLASTIC BAG, INJECTION (ML) INTRAVENOUS
Status: DISCONTINUED | OUTPATIENT
Start: 2018-03-23 | End: 2018-03-24 | Stop reason: HOSPADM

## 2018-03-23 RX ORDER — FUROSEMIDE 10 MG/ML
40 INJECTION INTRAMUSCULAR; INTRAVENOUS ONCE
Status: COMPLETED | OUTPATIENT
Start: 2018-03-23 | End: 2018-03-23

## 2018-03-23 RX ORDER — AMLODIPINE BESYLATE 5 MG/1
5 TABLET ORAL DAILY
Status: DISCONTINUED | OUTPATIENT
Start: 2018-03-23 | End: 2018-03-24 | Stop reason: HOSPADM

## 2018-03-23 RX ORDER — POLYETHYLENE GLYCOL 3350 17 G/17G
17 POWDER, FOR SOLUTION ORAL 2 TIMES DAILY PRN
Status: DISCONTINUED | OUTPATIENT
Start: 2018-03-23 | End: 2018-03-24 | Stop reason: HOSPADM

## 2018-03-23 RX ORDER — ATENOLOL 25 MG/1
25 TABLET ORAL DAILY
Status: DISCONTINUED | OUTPATIENT
Start: 2018-03-23 | End: 2018-03-24 | Stop reason: HOSPADM

## 2018-03-23 RX ORDER — LACTULOSE 10 G/15ML
15 SOLUTION ORAL 3 TIMES DAILY
Status: DISPENSED | OUTPATIENT
Start: 2018-03-23 | End: 2018-03-23

## 2018-03-23 RX ORDER — LEVOFLOXACIN 750 MG/1
750 TABLET ORAL DAILY
Qty: 30 TABLET | Refills: 11 | Status: SHIPPED | OUTPATIENT
Start: 2018-03-23

## 2018-03-23 RX ORDER — POLYETHYLENE GLYCOL 3350 17 G/17G
17 POWDER, FOR SOLUTION ORAL DAILY
Status: DISCONTINUED | OUTPATIENT
Start: 2018-03-23 | End: 2018-03-24 | Stop reason: HOSPADM

## 2018-03-23 RX ADMIN — POTASSIUM CHLORIDE 10 MEQ: 750 CAPSULE, EXTENDED RELEASE ORAL at 09:03

## 2018-03-23 RX ADMIN — APIXABAN 2.5 MG: 2.5 TABLET, FILM COATED ORAL at 09:03

## 2018-03-23 RX ADMIN — EZETIMIBE 10 MG: 10 TABLET ORAL at 09:03

## 2018-03-23 RX ADMIN — ATENOLOL 25 MG: 25 TABLET ORAL at 09:03

## 2018-03-23 RX ADMIN — POLYETHYLENE GLYCOL 3350 17 G: 17 POWDER, FOR SOLUTION ORAL at 09:03

## 2018-03-23 RX ADMIN — TORSEMIDE 20 MG: 20 TABLET ORAL at 09:03

## 2018-03-23 RX ADMIN — FUROSEMIDE 40 MG: 10 INJECTION, SOLUTION INTRAMUSCULAR; INTRAVENOUS at 08:03

## 2018-03-23 RX ADMIN — VANCOMYCIN HYDROCHLORIDE 1 G: 10 INJECTION, POWDER, LYOPHILIZED, FOR SOLUTION INTRAVENOUS at 10:03

## 2018-03-23 RX ADMIN — MONTELUKAST SODIUM 10 MG: 10 TABLET, FILM COATED ORAL at 08:03

## 2018-03-23 RX ADMIN — GABAPENTIN 300 MG: 300 CAPSULE ORAL at 08:03

## 2018-03-23 RX ADMIN — APIXABAN 2.5 MG: 2.5 TABLET, FILM COATED ORAL at 08:03

## 2018-03-23 RX ADMIN — POTASSIUM CHLORIDE 10 MEQ: 750 CAPSULE, EXTENDED RELEASE ORAL at 08:03

## 2018-03-23 RX ADMIN — ROPINIROLE HYDROCHLORIDE 0.5 MG: 0.5 TABLET, FILM COATED ORAL at 08:03

## 2018-03-23 RX ADMIN — SIMVASTATIN 40 MG: 40 TABLET, FILM COATED ORAL at 08:03

## 2018-03-23 RX ADMIN — CEFTRIAXONE SODIUM 2 G: 2 INJECTION, POWDER, FOR SOLUTION INTRAMUSCULAR; INTRAVENOUS at 06:03

## 2018-03-23 RX ADMIN — AMLODIPINE BESYLATE 5 MG: 5 TABLET ORAL at 09:03

## 2018-03-23 NOTE — NURSING
Condom cath applied per orders. Urinary frequency. Patient and wife also states has not made a BM in 3 days prune juice was given and miralax. And encouraged  to walk with assistance. Up in chair currently. Lactulose also ordered.

## 2018-03-23 NOTE — PLAN OF CARE
Problem: Fall Risk (Adult)  Goal: Identify Related Risk Factors and Signs and Symptoms  Related risk factors and signs and symptoms are identified upon initiation of Human Response Clinical Practice Guideline (CPG)   Outcome: Outcome(s) achieved Date Met: 03/23/18  Free of falls throughout shift. Call light in reach. Instructed to call staff for mobility. Up with assistance.     Problem: Diabetes, Type 2 (Adult)  Goal: Signs and Symptoms of Listed Potential Problems Will be Absent, Minimized or Managed (Diabetes, Type 2)  Signs and symptoms of listed potential problems will be absent, minimized or managed by discharge/transition of care (reference Diabetes, Type 2 (Adult) CPG).   Outcome: Ongoing (interventions implemented as appropriate)  Acu check AC HS. Bg controlled. Insulin ordered prn.     Problem: Pressure Ulcer Risk (Jovanni Scale) (Adult,Obstetrics,Pediatric)  Goal: Identify Related Risk Factors and Signs and Symptoms  Related risk factors and signs and symptoms are identified upon initiation of Human Response Clinical Practice Guideline (CPG)   Outcome: Ongoing (interventions implemented as appropriate)  Barrier cream applied to buttocks. Skin breakdown to area before hospital admit.

## 2018-03-23 NOTE — PROGRESS NOTES
"[facility] Hospital Medicine  Progress Note    Patient Name: Luis Belcher  MRN: [id number]  Patient Class: IP- Inpatient   Admission Date: 3/20/2018  Length of Stay: 3 days  Attending Physician: Kasie Osborne MD  Primary Care Provider: Laura Arrieta DO    [facility] Medicine Team: [facility] HOSP MED 4 Chrissie Restrepo MD    Subjective:     Principal Problem:Sepsis    HPI:  Mr. Luis Belcher is an 87 year old male with HTN, DM 2, CHF (unknown type or EF ), AAA s/p endovascular repair in 2001, atrial fibrillation, CKD stage 3 , and prostate cancer s/p radical prostatectomy 20 years ago. He is being admitted for fever.    Mr. Belcher recently moved to [place] from [place] this past December and has thus far been treated at [facility]. No records are available at this time. He  has a long history of recurrent fevers, which first occurred about 7 years ago. He has been on and off suppressive therapy with antibiotics over this course, and most recently completed a six week course of antibiotics on 12/18/17, at which time he moved from [place] to [place]. Family does not know which antibiotic he was on. Patient's wife reports he was followed by an infectious disease doctor in [place], and his infection stemmed from "a pocket of infection by his aorta", and that he was not a surgical candidate.      Most recently, Mr. Belcher was admitted to [facility] on 3/06/18 for fever, chills, n/v, weakness, and shortness of breath. The paperwork they provide is limited, but his discharge diagnoses included CHF exacerbation, elevated lactic acid, elevated troponin, and afib. He was discharged home on a course of ciprofloxacin 250mg bid and linezolid 600mg bid (uncertain length of course). Wife reports compliance with medications.     Today, Mr. Belcher presented to the ED after this afternoon, he suddenly developed a fever of 103 F, and felt weak, with chills, nausea, and two episodes of nonbloody emesis. He also " complained of right-sided temporal headache, which he described as throbbing, but has since resolved. He was treated in the ED with a 30cc/kg bolus, acetaminophen, ibuprofen, and IV vancomycin and piperacillin/tazobactam. He now feels much more comfortable and has no complaints.         Hospital Course:  Patient admitted to hospital medicine. Giving IV vanc and rocephin for aortitis and intra-abdominal abscess. Vascular and ID following.   Continuing current therapy for now. Patient would not like surgical intervention; patient with COPD and is a poor surgical candidate anyway per vascular.    Interval History:   NAEON. Some SOB and placed on oxygen by overnight nurse. Signs of volume overload- given one dose of IV lasix with large UOP. Family reports not being ready to go home given long drive and need to be prepared for patient with DME at home.     Review of Systems   He denies any fevers, chills, cough, SOB, CP, N/V, diarrhea or dysuria. Has frequent urination and some constipation.     Objective:     Vital Signs (Most Recent):  Temp: 97.1 °F (36.2 °C) (03/23/18 1146)  Pulse: 61 (03/23/18 1146)  Resp: 15 (03/23/18 1146)  BP: (!) 131/58 (03/23/18 1146)  SpO2: 98 % (03/23/18 1146) Vital Signs (24h Range):  Temp:  [97.1 °F (36.2 °C)-98.3 °F (36.8 °C)] 97.1 °F (36.2 °C)  Pulse:  [59-80] 61  Resp:  [15-18] 15  SpO2:  [90 %-100 %] 98 %  BP: (131-171)/(53-90) 131/58     Weight: 83.9 kg (185 lb)  Body mass index is 28.98 kg/m².    Intake/Output Summary (Last 24 hours) at 03/23/18 1158  Last data filed at 03/23/18 0856   Gross per 24 hour   Intake                0 ml   Output             1435 ml   Net            -1435 ml      Physical Exam   Constitutional: He is oriented to person, place, and time. He appears well-developed and well-nourished. No distress.   HENT:   Head: Normocephalic and atraumatic.   Mouth/Throat: Oropharynx is clear and moist.   Dental caries   Eyes: Conjunctivae and EOM are normal. No scleral  "icterus.   Neck: Normal range of motion. Neck supple. JVD present.   Cardiovascular: Normal rate.  Exam reveals no friction rub.    No murmur heard.  Irregular rhythm   Pulmonary/Chest: Effort normal. No respiratory distress. He has no wheezes. He has rales (few bibasilar).   Abdominal: Soft. Bowel sounds are normal. He exhibits no distension. There is no tenderness. There is no guarding.   Midline hernia at , soft and easily reducible   Musculoskeletal: Normal range of motion. He exhibits no edema or tenderness.   No back pain   Neurological: He is alert and oriented to person, place, and time.   Skin: Skin is warm and dry. No rash noted. He is not diaphoretic.   Psychiatric: He has a normal mood and affect. His behavior is normal.       Significant Labs: All pertinent labs within the past 24 hours have been reviewed.    Significant Imaging: I have reviewed all pertinent imaging results/findings within the past 24 hours.    Assessment/Plan:      * Sepsis    Patient presented with fever of 103.1 F, WBC 13, respiratory rate >20, and pulse >90 on admission  Lactic acid initially 2.4, downtrended to 1.4 after 30cc/kg bolus  Given reported history of "pocket of infection near aorta", infected aortic aneurysm is top of differential at this time, CTA abdomen and pelvis done revealing aortitis and para-iliac abscess  Intially treated with IV Vancomycin/Zosyn in the ED  - follow blood cultures  - continue empiric treatment with IV Vancomycin and Ceftriaxone  - Vascular surgery deems patient not a surgical candidate and patient prefers no intervention  - consult infectious disease, obtaining records from [place], will need lifelong abx suppression          Sleep apnea    CPAP qhs: EPAP 4 per wife          Anemia    Normocytic anemia, hemoglobin 9.2  Likely anemia of chronic inflammation perhaps with Fe deficient component  - B12, folate wnl          Stage 3 chronic kidney disease    Secondary to DM 2  Stage three per [facility] " "discharge paperwork  Cr on admission 1.7- improved since admission  Renal function may be affected by CTA, however contrast necessary to evaluate for source of infection. Patient had already received fluid bolus in the ED          Chronic atrial fibrillation    Currently rate controlled  Home med: apixaban 2.5mg bid, will continue        Chronic congestive heart failure    Grade 3 diastolic dysfuction on echo 3/21, EF 50%  Resume home torsemide 20mg for now, hemodynamically stable          Abdominal aortic aneurysm (AAA) without rupture    S/P endovascular repair in 2001  - see "sepsis"        Type 2 diabetes mellitus with renal complication    No diabetes medications on home med list, however used to take Januvia  - POCT glucose ac+hs  - SSI  - diabetic diet  - hypoglycemic protocol        Prostate cancer    History of prostate cancer s/p radical prostatectomy in 2001  However, per wife, patient has had "rising PSAs over the past few months". She reports that this was being monitored by urologist in [place]          Hypertension    -will restart amlodipine and atenolol          VTE Risk Mitigation         Ordered     apixaban tablet 2.5 mg  2 times daily     Route:  Oral        03/21/18 1534              Chrissie Restrepo MD  Department of Hospital Medicine   [facility]  "

## 2018-03-23 NOTE — PLAN OF CARE
Problem: Physical Therapy Goal  Goal: Physical Therapy Goal  Goals to be met by: 2018     Patient will increase functional independence with mobility by performin. Supine to sit with Supervision.  2. Rolling to Left and Right with Supervision.  3. Sit to stand transfer with Supervision.  4. Gait  x 250 feet with Supervision using Rolling Walker.   5. Lower extremity exercise program x 20 reps per handout, with supervision.    Outcome: Ongoing (interventions implemented as appropriate)  Goals set today.

## 2018-03-23 NOTE — ASSESSMENT & PLAN NOTE
87 year old male with history of AAA s/p open repair with hemishield graft placement in 2001. This was complicated by a chronic aortic graft infection which he was on oral suppressive antibiotics for many years following surgery (unclear of bacteria or antibiotic treated with). 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 Per records - this was followed by doxycyline suppression. He was followed by ID in [place] and taken off of antibiotic suppression in 3/2017. Follow up CT scan 9/2017 showed perigraft inflammatory stranding (unchanged from prior 4 years) with wall thickening and air within the aneursymal sac. 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 WILTON negative during admission. No susceptibilities were run on isolate. Course complicated by GISELL and Vancomycin was switched to Daptomycin. He completed antibiotic therapy 12/3 prior to moving to [place]. Had been off antibiotics since.     He has since been admitted to [facility] on 3/6/18 for fever, chills, n/v, weakness, SOB and wet cough. He was treated with ciprofloxacin and linezolid while inpatient for possible pneumonia. Blood cultures 3/8 and 3/12 were negative. Symptoms resolved and patient was discharged on Ciprofloxacin.      Shortly after discharge patient developed fevers, rigors and N/V at home and is now admitted to [facility] [place] further evaluation. Patient is currently on Vancomycin and Ceftriaxone. CRP 54.8. ESR 80. WBC 14.85. Blood cultures 3/20 NGTD.  2D echo negative for vegetation.     CT chest/abd here is concerning for aortitis involving the infrarenal abd aorta at the level of the graft bifurcation to the common iliacs and possible abscess 2.3 X 2.0 anterior to the left common iliac graft. Vascular Surgery is following. Patient not a surgical candidate.     Since starting  antibiotics fevers and leukocytosis have resolved. Patient hemodynamically stable. Symptomatically much improved without current complaints.           Plan  - Granulicatella isolates are known to have variable resistance to beta lactams. Unfortunately, no susceptibilities were obtained on this isolate during prior hospitalization.   - Recommend discharging patient on Levofloxacin 750 mg PO once daily.  - Please take Levofloxacin 2 hours before or after mineral supplements, oral multivitamins, dairy products, or calcium-fortified juices due to food-drug interactions.  - As patient is not a surgical candidate for graft removal, he will require lifelong suppressive antibiotic therapy for this infection.  - Please arrange ID follow up with Dr. Sorto, patient's ID provider at [facility] shortly after discharge.  - ID will sign off.

## 2018-03-23 NOTE — PT/OT/SLP EVAL
Physical Therapy Evaluation    Patient Name:  Luis Belcher   MRN:  [id number]    Recommendations:     Discharge Recommendations:  home health PT   Discharge Equipment Recommendations: none   Barriers to discharge: None    Assessment:     Luis Belcher is a 87 y.o. male admitted with a medical diagnosis of Sepsis.  He presents with the following impairments/functional limitations:  weakness, impaired endurance, impaired self care skills, impaired functional mobilty. Pt cooperative and agreeable to treatment. Pt tolerated treatment well with no c/o of SOB, dizziness, or pain. Pt performed bed mobility with SBA, performed sit<>stand with SBA-CGA using RW, and ambulated ~125' with CGA and using RW. Recommending HHPT upon discharge, as pt would benefit, was seeing HHPT prior to this hospitalization, and would like to continue upon discharge.    Rehab Prognosis:  good; patient would benefit from acute skilled PT services to address these deficits and reach maximum level of function.      Recent Surgery: * No surgery found *      Plan:     During this hospitalization, patient to be seen 5 x/week to address the above listed problems via gait training, therapeutic activities, therapeutic exercises  · Plan of Care Expires:  04/22/18   Plan of Care Reviewed with: patient, spouse, family    Subjective     Communicated with nursing prior to session.  Patient found supine in bed, with wife and sister-in-law in room, upon PT entry to room, agreeable to evaluation.      Chief Complaint: need to urinate  Patient comments/goals: to go for a walk  Pain/Comfort:  Pain Rating 1: 0/10    Patients cultural, spiritual, Mormonism conflicts given the current situation: none    Living Environment:  Pt lives with wife and sister-in-law in a [facility] with a ramp with (B) handrails to enter.   Prior to admission, patients level of function was modified independent with use of RW and WC for long community distances.  Patient has the following  equipment: CPAP, raised toilet, shower chair, grab bar, walker, rolling, wheelchair.  DME owned (not currently used): none.  Upon discharge, patient will have assistance from family as needed.    Objective:     Patient found with: peripheral IV, oxygen (2 L of O2)     General Precautions: Standard, fall   Orthopedic Precautions:N/A   Braces: N/A     Exams:  · Cognitive Exam:  Patient is oriented to Person, Place, Time and Situation and follows 100% of all commands   · RUE ROM: WFL  · RUE Strength: WFL  · LUE ROM: WFL  · LUE Strength: WFL  · RLE ROM: WFL  · RLE Strength: WFL  · LLE ROM: WFL  · LLE Strength: WFL    Functional Mobility:  · Bed Mobility:     · Rolling Left:  stand by assistance  · Scooting: stand by assistance  · Supine to Sit: stand by assistance  · Transfers:     · Sit to Stand:  stand by assistance and contact guard assistance with rolling walker  · Gait: ~125' with CGA and using RW; decreased mariluz and gait speed; no LOB  · Balance: good static/dynamic sitting balance seated EOB during strength testing; good static/dynamic balance during don/doffing briefs and gait activity    AM-PAC 6 CLICK MOBILITY  Total Score:19       Therapeutic Activities and Exercises:  Pt performed bed mobility SBA, donned clean gowns, sit<>stand x 3 to doff/don clean briefs and use bedside urinal (in standing) with RW and CGA, ambulated ~125' with RW and CGA.      PT educated pt on:  -PT POC  -discharge needs (wife and pt want HHPT to continue)  -importance of mobility  -getting up with aid of nursing  -LE therex in chair/bed (marches, heel slides, hip abd/add, ankle pumps, TKEs)    Patient left up in chair with all lines intact, call button in reach and wife and sister-in-law present.    GOALS:    Physical Therapy Goals        Problem: Physical Therapy Goal    Goal Priority Disciplines Outcome Goal Variances Interventions   Physical Therapy Goal     PT/OT, PT Ongoing (interventions implemented as appropriate)      Description:  Goals to be met by: 2018     Patient will increase functional independence with mobility by performin. Supine to sit with Supervision.  2. Rolling to Left and Right with Supervision.  3. Sit to stand transfer with Supervision.  4. Gait  x 250 feet with Supervision using Rolling Walker.   5. Lower extremity exercise program x 20 reps per handout, with supervision.                      History:     Past Medical History:   Diagnosis Date    AAA (abdominal aortic aneurysm)     Arthritis     Bloodstream infection     Cancer     prostste    Diabetes mellitus     Heart disease     Hypertension        Past Surgical History:   Procedure Laterality Date    ABDOMINAL AORTIC ANEURYSM REPAIR      COCHLEAR IMPLANT REVISION      PROSTATECTOMY         Clinical Decision Making:     History  Co-morbidities and personal factors that may impact the plan of care Examination  Body Structures and Functions, activity limitations and participation restrictions that may impact the plan of care Clinical Presentation   Decision Making/ Complexity Score   Co-morbidities:   [] Time since onset of injury / illness / exacerbation  [] Status of current condition  []Patient's cognitive status and safety concerns    [] Multiple Medical Problems (see med hx)  Personal Factors:   [] Patient's age  [] Prior Level of function   [] Patient's home situation (environment and family support)  [] Patient's level of motivation  [] Expected progression of patient      HISTORY:(criteria)    [] 74844 - no personal factors/history    [] 80795 - has 1-2 personal factor/comorbidity     [] 47816 - has >3 personal factor/comorbidity     Body Regions:  [] Objective examination findings  [] Head     []  Neck  [] Trunk   [] Upper Extremity  [] Lower Extremity    Body Systems:  [] For communication ability, affect, cognition, language, and learning style: the assessment of the ability to make needs known, consciousness, orientation  (person, place, and time), expected emotional /behavioral responses, and learning preferences (eg, learning barriers, education  needs)  [] For the neuromuscular system: a general assessment of gross coordinated movement (eg, balance, gait, locomotion, transfers, and transitions) and motor function  (motor control and motor learning)  [] For the musculoskeletal system: the assessment of gross symmetry, gross range of motion, gross strength, height, and weight  [] For the integumentary system: the assessment of pliability(texture), presence of scar formation, skin color, and skin integrity  [] For cardiovascular/pulmonary system: the assessment of heart rate, respiratory rate, blood pressure, and edema     Activity limitations:    [] Patient's cognitive status and saf ety concerns          [] Status of current condition      [] Weight bearing restriction  [] Cardiopulmunary Restriction    Participation Restrictions:   [] Goals and goal agreement with the patient     [] Rehab potential (prognosis) and probable outcome      Examination of Body System: (criteria)    [] 88674 - addressing 1-2 elements    [] 73748 - addressing a total of 3 or more elements     [] 88992 -  Addressing a total of 4 or more elements         Clinical Presentation: (criteria)  Choose one     On examination of body system using standardized tests and measures patient presents with (CHOOSE ONE) elements from any of the following: body structures and functions, activity limitations, and/or participation restrictions.  Leading to a clinical presentation that is considered (CHOOSE ONE)                              Clinical Decision Making  (Eval Complexity):  Choose One     Time Tracking:     PT Received On: 03/23/18  PT Start Time: 1039     PT Stop Time: 1111  PT Total Time (min): 32 min     Billable Minutes: Evaluation 22 mins and Therapeutic Activity 10 mins      Cande Lepe, DANA  03/23/2018

## 2018-03-23 NOTE — SUBJECTIVE & OBJECTIVE
Interval History: NAEON. Remains afebrile. No leukocytosis.  Blood cx NGTD. Feeling well today. Without any complaints.     Records received from OSH. Will scan in chart.    Patient has history of a 7 cm abdominal aortic aneurysm s/p repair with open technique and hemishield graft placement 2/2001. This was complicated by an aortic graft infection which he was on oral suppressive antibiotics for many years following surgery (unclear of bacteria or antibiotic treated with). 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 This was followed by doxycyline suppression. He was taken off of antibiotic suppression in March 2017 and followed closely by ID (Dr. Shoemaker) and Dr. Suh (surgeon). Apparently a follow up CT scan 9/2017 showed perigraft inflammatory stranding (unchanged from prior 4 years) with wall thickening and air within the aneursymal sac. Unclear of treatment history - appears to not have been started on antibiotics.  About a month later he developed fevers and weakness prompting him to present to the ER in [place].     Patient was admitted to [facility] for Granulicatella adiacens bacteremia 10/30/17 placed on Vancomycin and Ceftriaxone x 6 weeks at discharge. WILTON negative during admission. Indium showed increase activity in both lungs (unclear significance - had pneumonia a few months prior - asymptomatic at time of Indium with normal CXR). No susceptibilities were run on isolate. Course complicated by GISELL and Vancomycin was switched to Daptomycin. He completed antibiotic therapy 12/3. Repeat blood cultures at clinic follow up 12/8 were NGTD. Was off antibiotics until admission this month.     ID physician - Dr. Solis        Review of Systems   Constitutional: Positive for chills (resolved) and fever (resolved). Negative for activity change, appetite change and fatigue.   HENT: Negative for rhinorrhea.     Respiratory: Negative for cough and shortness of breath.    Cardiovascular: Negative for chest pain.   Gastrointestinal: Positive for nausea (on admission - resolved). Negative for abdominal pain, diarrhea and vomiting.   Genitourinary: Positive for frequency. Negative for dysuria and hematuria.   Musculoskeletal: Negative for back pain and neck pain.   Skin: Negative for color change and rash.   Neurological: Negative for headaches.   Psychiatric/Behavioral: Negative for confusion.     Objective:     Vital Signs (Most Recent):  Temp: 97.1 °F (36.2 °C) (03/23/18 1146)  Pulse: 61 (03/23/18 1146)  Resp: 15 (03/23/18 1146)  BP: (!) 131/58 (03/23/18 1146)  SpO2: 98 % (03/23/18 1146) Vital Signs (24h Range):  Temp:  [97.1 °F (36.2 °C)-98.3 °F (36.8 °C)] 97.1 °F (36.2 °C)  Pulse:  [59-80] 61  Resp:  [15-18] 15  SpO2:  [90 %-100 %] 98 %  BP: (131-171)/(53-90) 131/58     Weight: 83.9 kg (185 lb)  Body mass index is 28.98 kg/m².    Estimated Creatinine Clearance: 44.9 mL/min (based on SCr of 1.2 mg/dL).    Physical Exam   Constitutional: He is oriented to person, place, and time. He appears well-developed and well-nourished. No distress.   Pleasantly conversant   HENT:   Head: Normocephalic and atraumatic.   Mouth/Throat: No dental abscesses or dental caries.   Edentulous. Metal plate.   Eyes: Conjunctivae are normal. No scleral icterus.   Cardiovascular: Normal rate and normal heart sounds.  An irregularly irregular rhythm present.   No murmur heard.  Pulmonary/Chest: Effort normal and breath sounds normal. No respiratory distress. He has no wheezes. He has no rales.   Abdominal: Soft. He exhibits no distension. There is no tenderness. There is no guarding.   Prior surgical scar noted.  Ventral hernia.   Musculoskeletal: He exhibits no edema or tenderness.   Neurological: He is alert and oriented to person, place, and time.   Skin: Skin is warm and dry. No rash noted. He is not diaphoretic.   Psychiatric: He has a normal  mood and affect. His behavior is normal.       Significant Labs:   Blood Culture:   Recent Labs  Lab 03/20/18  1938   LABBLOO No Growth to date  No Growth to date  No Growth to date  No Growth to date  No Growth to date  No Growth to date     CBC:   Recent Labs  Lab 03/22/18  0606 03/23/18  0402   WBC 9.80 8.69   HGB 7.7* 7.6*   HCT 25.3* 24.7*    197     CMP:   Recent Labs  Lab 03/22/18  0606 03/23/18  0402    141   K 4.1 3.7    106   CO2 26 26   * 120*   BUN 22 22   CREATININE 1.3 1.2   CALCIUM 9.1 8.2*   PROT 6.4 6.4   ALBUMIN 2.6* 2.5*   BILITOT 0.5 0.6   ALKPHOS 78 100   AST 14 16   ALT 11 12   ANIONGAP 10 9   EGFRNONAA 49.1* 54.0*     HIV 1/2 Antibodies: No results for input(s): [id number] in the last 48 hours.  HIV Rapid: No results for input(s): HIVRAPID in the last 48 hours.  Lactic Acid: No results for input(s): LACTATE in the last 48 hours.  Lipase: No results for input(s): LIPASE in the last 48 hours.  Procalcitonin: No results for input(s): PROCAL in the last 48 hours.  Quantiferon: No results for input(s): NIL, TBAG, TBAGNIL, MITOGENNIL, TBGOLD in the last 48 hours.  Respiratory Culture: No results for input(s): GSRESP, RESPIRATORYC in the last 4320 hours.  Urine Culture: No results for input(s): LABURIN in the last 4320 hours.  Urine Studies:   Recent Labs  Lab 03/20/18 2004   COLORU Yellow   APPEARANCEUA Clear   PHUR 5.0   SPECGRAV 1.010   PROTEINUA Negative   GLUCUA Negative   KETONESU Negative   BILIRUBINUA Negative   OCCULTUA Negative   NITRITE Negative   UROBILINOGEN Negative   LEUKOCYTESUR Negative     Wound Culture: No results for input(s): LABAERO in the last 4320 hours.    Significant Imaging: I have reviewed all pertinent imaging results/findings within the past 24 hours.

## 2018-03-23 NOTE — SUBJECTIVE & OBJECTIVE
Interval History:   NAEON. Some SOB and placed on oxygen by overnight nurse. Signs of volume overload- given one dose of IV lasix with large UOP. Family reports not being ready to go home given long drive and need to be prepared for patient with DME at home.     Review of Systems   He denies any fevers, chills, cough, SOB, CP, N/V, diarrhea or dysuria. Has frequent urination and some constipation.     Objective:     Vital Signs (Most Recent):  Temp: 97.1 °F (36.2 °C) (03/23/18 1146)  Pulse: 61 (03/23/18 1146)  Resp: 15 (03/23/18 1146)  BP: (!) 131/58 (03/23/18 1146)  SpO2: 98 % (03/23/18 1146) Vital Signs (24h Range):  Temp:  [97.1 °F (36.2 °C)-98.3 °F (36.8 °C)] 97.1 °F (36.2 °C)  Pulse:  [59-80] 61  Resp:  [15-18] 15  SpO2:  [90 %-100 %] 98 %  BP: (131-171)/(53-90) 131/58     Weight: 83.9 kg (185 lb)  Body mass index is 28.98 kg/m².    Intake/Output Summary (Last 24 hours) at 03/23/18 1158  Last data filed at 03/23/18 0856   Gross per 24 hour   Intake                0 ml   Output             1435 ml   Net            -1435 ml      Physical Exam   Constitutional: He is oriented to person, place, and time. He appears well-developed and well-nourished. No distress.   HENT:   Head: Normocephalic and atraumatic.   Mouth/Throat: Oropharynx is clear and moist.   Dental caries   Eyes: Conjunctivae and EOM are normal. No scleral icterus.   Neck: Normal range of motion. Neck supple. JVD present.   Cardiovascular: Normal rate.  Exam reveals no friction rub.    No murmur heard.  Irregular rhythm   Pulmonary/Chest: Effort normal. No respiratory distress. He has no wheezes. He has rales (few bibasilar).   Abdominal: Soft. Bowel sounds are normal. He exhibits no distension. There is no tenderness. There is no guarding.   Midline hernia at , soft and easily reducible   Musculoskeletal: Normal range of motion. He exhibits no edema or tenderness.   No back pain   Neurological: He is alert and oriented to person, place, and time.    Skin: Skin is warm and dry. No rash noted. He is not diaphoretic.   Psychiatric: He has a normal mood and affect. His behavior is normal.       Significant Labs: All pertinent labs within the past 24 hours have been reviewed.    Significant Imaging: I have reviewed all pertinent imaging results/findings within the past 24 hours.

## 2018-03-23 NOTE — PLAN OF CARE
"CM to bedside - pt, spouse & sister-in-law present; everyone provided assessment info. Pt w/ DME in place, lives w/ spouse & sister-in-law at the sister-in-law's home. Pt is active w/ [facility] h/h. Family is requesting a hospital bed for pt which he does not qualify for; CM provided family w/ info for [employer] where family can make arrangements to rent/reant to own/buy a hospital bed for home. CM also provided spouse w/ info pamphlet on [employer] for caregiver services [facility] as pt is a 20 [employer] vet.    Pt's PCP's contact info is being changed in EPIC; hospital f/u appt 4/3 @ 1pm    CM provided patient anticipated [facility] which will be update by nursing staff. Patient provided a Blue "My Health Packet" for d/c planning and health tool. Patient verbalized understanding.     03/23/18 1228   Discharge Assessment   Assessment Type Discharge Planning Assessment   Confirmed/corrected address and phone number on facesheet? Yes   Assessment information obtained from? Patient;Caregiver   Expected Length of Stay (days) 3   Communicated expected length of stay with patient/caregiver yes   Prior to hospitilization cognitive status: Alert/Oriented   Prior to hospitalization functional status: Assistive Equipment;Needs Assistance   Current cognitive status: Alert/Oriented   Current Functional Status: Assistive Equipment;Needs Assistance   Facility Arrived From: N/A   Lives With spouse;other relative(s)  (sister-in-law)   Able to Return to Prior Arrangements yes   Is patient able to care for self after discharge? No   Who are your caregiver(s) and their phone number(s)? liang - Denise [phone]   Patient's perception of discharge disposition home health   Readmission Within The Last 30 Days unable to assess   Patient currently being followed by outpatient case management? No   Patient currently receives any other outside agency services? Yes   Name and contact number of agency or person providing outside " services [facility] h/h   Is it the patient/care giver preference to resume care with the current outside agency? Yes   Equipment Currently Used at Home CPAP;walker, rolling;wheelchair;glucometer   Do you have any problems affording any of your prescribed medications? No   Is the patient taking medications as prescribed? yes   Does the patient have transportation home? Yes   Transportation Available family or friend will provide   Dialysis Name and Scheduled days N/A   Does the patient receive services at the [facility]? No   Discharge Plan A Home with family;Home Health   Discharge Plan B Skilled Nursing Facility   Patient/Family In Agreement With Plan yes

## 2018-03-23 NOTE — PLAN OF CARE
FREDERICK assigned to case today 3/23/2018. SW will assist team with DC needs. FREDERICK in communication with CM.    Patient active with [facility] and wants to resume with them at DC. FREDER[facility] and notified them that patient will likely DC on Saturday.   Orders need to be sent to [facility] once written.     Darlene Hernandez, MARGAUX  [phone]

## 2018-03-23 NOTE — NURSING
Patient has redness on buttocks. Barrier cream applied. Wound care consult ordered. LDA put in computer. Will continue to monitor.

## 2018-03-23 NOTE — NURSING
Patient is to be discharged tomorrow informed MD that Lasix was adminstered today and K was 3.7 today @ 0400. Patient has been urinating very frequently and I suggested to MD to have lab drawn before going home. MD agreed.

## 2018-03-23 NOTE — ASSESSMENT & PLAN NOTE
Secondary to DM 2  Stage three per [facility] discharge paperwork  Cr on admission 1.7- improved since admission  Renal function may be affected by CTA, however contrast necessary to evaluate for source of infection. Patient had already received fluid bolus in the ED

## 2018-03-23 NOTE — NURSING
Patient up walking in hart with PT. O2 was titrated down from 2L to 1L patient tolerating well. Patient and wife also states he has not made a B

## 2018-03-23 NOTE — PROGRESS NOTES
[facility]  Infectious Disease  Progress Note    Patient Name: Luis Belcher  MRN: [id number]  Admission Date: 3/20/2018  Length of Stay: 3 days  Attending Physician: Kasie Osborne MD  Primary Care Provider: No primary care provider on file.    Isolation Status: No active isolations  Assessment/Plan:      * Sepsis       87 year old male with history of AAA s/p open repair with hemishield graft placement in 2001. This was complicated by a chronic aortic graft infection which he was on oral suppressive antibiotics for many years following surgery (unclear of bacteria or antibiotic treated with). 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 Per records - this was followed by doxycyline suppression. He was followed by ID in [place] and taken off of antibiotic suppression in 3/2017. Follow up CT scan 9/2017 showed perigraft inflammatory stranding (unchanged from prior 4 years) with wall thickening and air within the aneursymal sac. 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 WILTON negative during admission. No susceptibilities were run on isolate. Course complicated by GISELL and Vancomycin was switched to Daptomycin. He completed antibiotic therapy 12/3 prior to moving to [place]. Had been off antibiotics since.     He has since been admitted to [facility] on 3/6/18 for fever, chills, n/v, weakness, SOB and wet cough. He was treated with ciprofloxacin and linezolid while inpatient for possible pneumonia. Blood cultures 3/8 and 3/12 were negative. Symptoms resolved and patient was discharged on Ciprofloxacin.      Shortly after discharge patient developed fevers, rigors and N/V at home and is now admitted to [facility] for further evaluation. Patient is currently on Vancomycin and Ceftriaxone. CRP 54.8. ESR 80. WBC 14.85. Blood cultures 3/20  NGTD.  2D echo negative for vegetation.     CT chest/abd here is concerning for aortitis involving the infrarenal abd aorta at the level of the graft bifurcation to the common iliacs and possible abscess 2.3 X 2.0 anterior to the left common iliac graft. Vascular Surgery is following. Patient not a surgical candidate.     Since starting antibiotics fevers and leukocytosis have resolved. Patient hemodynamically stable. Symptomatically much improved without current complaints.           Plan  - Granulicatella isolates are known to have variable resistance to beta lactams. Unfortunately, no susceptibilities were obtained on this isolate during prior hospitalization.   - Recommend discharging patient on Levofloxacin 750 mg PO once daily.  - Please take Levofloxacin 2 hours before or after mineral supplements, oral multivitamins, dairy products, or calcium-fortified juices due to food-drug interactions.  - As patient is not a surgical candidate for graft removal, he will require lifelong suppressive antibiotic therapy for this infection.  - Please arrange ID follow up with Dr. Sorto, patient's ID provider at [facility] shortly after discharge.  - ID will sign off.            Please call for any questions. Thank you.  Alka Piña PA-C  Phone: [phone]  Pager: [phone]    Subjective:     Principal Problem:Sepsis    HPI:   Mr. Belcher is an 87 year old male with HTN, DM 2, CHF (unknown type or EF ), AAA s/p endovascular repair in 2001, atrial fibrillation, CKD stage 3 , and prostate cancer s/p radical prostatectomy 20 years ago. He is being admitted for fevers, chills, n/v.  He recently moved from [place] to [place] in December. He has a long history of recurrent fevers, which first occurred about 7 years ago. He has been on and off suppressive therapy with antibiotics over this course, and most recently completed a six week course of antibiotics on 12/18/17. Family does not know which antibiotic he was on. Patient's  "wife reports he was followed by an infectious disease doctor in [place], and his infection stemmed from "a pocket of infection by his aorta" (dx on nuclear medicine scan) and that he was not a surgical candidate.       Most recently, Mr. Belcher was admitted to [facility] on 3/06/18 for fever, chills, n/v, weakness, shortness of breath and wet cough. The paperwork they provide is limited, but his discharge diagnoses included CHF exacerbation, elevated lactic acid, elevated troponin, and afib. He was hospitalized until 3/11/18. During his stay he was treated with ciprofloxacin 250mg bid and linezolid 600mg bid for pneumonia and discharged off antibiotics. Of note, he had been off antibiotics prior to that admission since December. Blood cultures on 3/8 and 3/12 were NGTD at 5 days.      On 3/20, Mr. Belcher presented to the ED with sudden onset of fever of 103 F, weakness, chills,  nausea, and two episodes of nonbloody emesis. He also complained of right-sided temporal headache, which he described as throbbing, but has since resolved. He was treated in the ED with a 30cc/kg bolus, acetaminophen, ibuprofen, and IV vancomycin and piperacillin/tazobactam.  ID is consulted for recommendations.       Blood cultures 3/20  NGTD    CTA chest/Abdomen - c/f aortitis involving the infrarenal abd aorta at the level of the graft bifurcation to the common iliacs, graft involvement cannot be excluded.  Possible abscess 2.3 X 2.0 anterior to the left common iliac graft.     2D Echo - negative for vegetation     WBC 14.85  CRP 54.88    Patient reports he follows with Dr. Sorto at  Infectious Disease  ID doctor in [place] - Dr. Azul Shoemaker     At time of visit patient is afebrile and reports he "feels great".  Denies chills, sweats.  No further n/v.  Good appetite. Denies any pain.   No culture data as yet from [place].      Records received from [facility]. Summarized below.     Patient has history of a 7 cm abdominal aortic aneurysm " s/p repair with open technique and hemishield graft placement 2/2001. This was complicated by an aortic graft infection which he was on oral suppressive antibiotics for many years following surgery (unclear of bacteria or antibiotic treated with). 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 This was followed by doxycyline suppression. He was taken off of antibiotic suppression in March 2017 and followed closely by ID (Dr. Shoemaker) and Dr. Suh (surgeon). Apparently a follow up CT scan 9/2017 showed perigraft inflammatory stranding (unchanged from prior 4 years) with wall thickening and air within the aneursymal sac. Unclear of treatment history - appears to not have been started on antibiotics.  About a month later he developed fevers and weakness prompting him to present to the ER in [place].     Patient was admitted to [facility] for Granulicatella adiacens bacteremia 10/30/17 placed on Vancomycin and Ceftriaxone x 6 weeks at discharge. WILTON negative during admission. Indium showed increase activity in both lungs (unclear significance - had pneumonia a few months prior - asymptomatic at time of Indium with normal CXR). No susceptibilities were run on isolate. Course complicated by GISELL and Vancomycin was switched to Daptomycin. He completed antibiotic therapy 12/3. Repeat blood cultures at clinic follow up 12/8 were NGTD. Was off antibiotics until admission this month.     ID physician - Dr. Solis  Interval History: NAEON. Remains afebrile. No leukocytosis.  Blood cx NGTD. Feeling well today. Without any complaints.     Records received from OSH. Will scan in chart.    Patient has history of a 7 cm abdominal aortic aneurysm s/p repair with open technique and hemishield graft placement 2/2001. This was complicated by an aortic graft infection which he was on oral suppressive antibiotics for many years following surgery  (unclear of bacteria or antibiotic treated with). 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 This was followed by doxycyline suppression. He was taken off of antibiotic suppression in March 2017 and followed closely by ID (Dr. Shoemaker) and Dr. Suh (surgeon). Apparently a follow up CT scan 9/2017 showed perigraft inflammatory stranding (unchanged from prior 4 years) with wall thickening and air within the aneursymal sac. Unclear of treatment history - appears to not have been started on antibiotics.  About a month later he developed fevers and weakness prompting him to present to the ER in [place].     Patient was admitted to [facility] for Granulicatella adiacens bacteremia 10/30/17 placed on Vancomycin and Ceftriaxone x 6 weeks at discharge. WILTON negative during admission. Indium showed increase activity in both lungs (unclear significance - had pneumonia a few months prior - asymptomatic at time of Indium with normal CXR). No susceptibilities were run on isolate. Course complicated by GISELL and Vancomycin was switched to Daptomycin. He completed antibiotic therapy 12/3. Repeat blood cultures at clinic follow up 12/8 were NGTD. Was off antibiotics until admission this month.     ID physician - Dr. Solis        Review of Systems   Constitutional: Positive for chills (resolved) and fever (resolved). Negative for activity change, appetite change and fatigue.   HENT: Negative for rhinorrhea.    Respiratory: Negative for cough and shortness of breath.    Cardiovascular: Negative for chest pain.   Gastrointestinal: Positive for nausea (on admission - resolved). Negative for abdominal pain, diarrhea and vomiting.   Genitourinary: Positive for frequency. Negative for dysuria and hematuria.   Musculoskeletal: Negative for back pain and neck pain.   Skin: Negative for color change and rash.   Neurological: Negative for headaches.    Psychiatric/Behavioral: Negative for confusion.     Objective:     Vital Signs (Most Recent):  Temp: 97.1 °F (36.2 °C) (03/23/18 1146)  Pulse: 61 (03/23/18 1146)  Resp: 15 (03/23/18 1146)  BP: (!) 131/58 (03/23/18 1146)  SpO2: 98 % (03/23/18 1146) Vital Signs (24h Range):  Temp:  [97.1 °F (36.2 °C)-98.3 °F (36.8 °C)] 97.1 °F (36.2 °C)  Pulse:  [59-80] 61  Resp:  [15-18] 15  SpO2:  [90 %-100 %] 98 %  BP: (131-171)/(53-90) 131/58     Weight: 83.9 kg (185 lb)  Body mass index is 28.98 kg/m².    Estimated Creatinine Clearance: 44.9 mL/min (based on SCr of 1.2 mg/dL).    Physical Exam   Constitutional: He is oriented to person, place, and time. He appears well-developed and well-nourished. No distress.   Pleasantly conversant   HENT:   Head: Normocephalic and atraumatic.   Mouth/Throat: No dental abscesses or dental caries.   Edentulous. Metal plate.   Eyes: Conjunctivae are normal. No scleral icterus.   Cardiovascular: Normal rate and normal heart sounds.  An irregularly irregular rhythm present.   No murmur heard.  Pulmonary/Chest: Effort normal and breath sounds normal. No respiratory distress. He has no wheezes. He has no rales.   Abdominal: Soft. He exhibits no distension. There is no tenderness. There is no guarding.   Prior surgical scar noted.  Ventral hernia.   Musculoskeletal: He exhibits no edema or tenderness.   Neurological: He is alert and oriented to person, place, and time.   Skin: Skin is warm and dry. No rash noted. He is not diaphoretic.   Psychiatric: He has a normal mood and affect. His behavior is normal.       Significant Labs:   Blood Culture:   Recent Labs  Lab 03/20/18  1938   LABBLOO No Growth to date  No Growth to date  No Growth to date  No Growth to date  No Growth to date  No Growth to date     CBC:   Recent Labs  Lab 03/22/18  0606 03/23/18  0402   WBC 9.80 8.69   HGB 7.7* 7.6*   HCT 25.3* 24.7*    197     CMP:   Recent Labs  Lab 03/22/18  0606 03/23/18  0402    141   K  4.1 3.7    106   CO2 26 26   * 120*   BUN 22 22   CREATININE 1.3 1.2   CALCIUM 9.1 8.2*   PROT 6.4 6.4   ALBUMIN 2.6* 2.5*   BILITOT 0.5 0.6   ALKPHOS 78 100   AST 14 16   ALT 11 12   ANIONGAP 10 9   EGFRNONAA 49.1* 54.0*     HIV 1/2 Antibodies: No results for input(s): [id number] in the last 48 hours.  HIV Rapid: No results for input(s): HIVRAPID in the last 48 hours.  Lactic Acid: No results for input(s): LACTATE in the last 48 hours.  Lipase: No results for input(s): LIPASE in the last 48 hours.  Procalcitonin: No results for input(s): PROCAL in the last 48 hours.  Quantiferon: No results for input(s): NIL, TBAG, TBAGNIL, MITOGENNIL, TBGOLD in the last 48 hours.  Respiratory Culture: No results for input(s): GSRESP, RESPIRATORYC in the last 4320 hours.  Urine Culture: No results for input(s): LABURIN in the last 4320 hours.  Urine Studies:   Recent Labs  Lab 03/20/18 2004   COLORU Yellow   APPEARANCEUA Clear   PHUR 5.0   SPECGRAV 1.010   PROTEINUA Negative   GLUCUA Negative   KETONESU Negative   BILIRUBINUA Negative   OCCULTUA Negative   NITRITE Negative   UROBILINOGEN Negative   LEUKOCYTESUR Negative     Wound Culture: No results for input(s): LABAERO in the last 4320 hours.    Significant Imaging: I have reviewed all pertinent imaging results/findings within the past 24 hours.

## 2018-03-23 NOTE — PLAN OF CARE
[facility]    HOME HEALTH ORDERS  FACE TO FACE ENCOUNTER    Patient Name: Luis Belcher  YOB: 1930    PCP: No primary care provider on file.   PCP Address: No primary physician on file.  PCP Phone Number: None  PCP Fax: None    Encounter Date: 03/23/2018    Admit to Home Health    Diagnoses:  Active Hospital Problems    Diagnosis  POA    *Sepsis [A41.9]  Yes    Aortitis [I77.6]  Unknown    Infected aortic graft, subsequent encounter [T82.7XXD]  Not Applicable    Sleep apnea [G47.30]  Unknown    Hypertension [I10]  Yes    Prostate cancer [C61]  Yes    Type 2 diabetes mellitus with renal complication [E11.29]  Yes    Abdominal aortic aneurysm (AAA) without rupture [I71.4]  Yes    Chronic congestive heart failure [I50.9]  Yes    Chronic atrial fibrillation [I48.2]  Yes    Stage 3 chronic kidney disease [N18.3]  Yes    Anemia [D64.9]  Yes      Resolved Hospital Problems    Diagnosis Date Resolved POA   No resolved problems to display.       No future appointments.  Follow-up Information     [facility].    Specialty:  Home Health Services  Why:  Home Health  Contact information:  [id number] [id number]  [phone]                     I have seen and examined this patient face to face today. My clinical findings that support the need for the home health skilled services and home bound status are the following:  Weakness/numbness causing balance and gait disturbance due to Infection and Weakness/Debility making it taxing to leave home.    Allergies:Review of patient's allergies indicates:  No Known Allergies    Diet: 2 gram sodium diet    Activities: activity as tolerated    Nursing:   SN to complete comprehensive assessment including routine vital signs. Instruct on disease process and s/s of complications to report to MD. Review/verify medication list sent home with the patient at time of discharge  and instruct patient/caregiver as  needed. Frequency may be adjusted depending on start of care date.    Notify MD if SBP > 160 or < 90; DBP > 90 or < 50; HR > 120 or < 50; Temp > 101      CONSULTS:    Physical Therapy to evaluate and treat. Evaluate for home safety and equipment needs; Establish/upgrade home exercise program. Perform / instruct on therapeutic exercises, gait training, transfer training, and Range of Motion.  Occupational Therapy to evaluate and treat. Evaluate home environment for safety and equipment needs. Perform/Instruct on transfers, ADL training, ROM, and therapeutic exercises.    MISCELLANEOUS CARE:  N/A    WOUND CARE ORDERS  yes:  Pressure Ulcer(s) Stage I :   Location: sacrum  Apply Miconzazole:  Barrier ointment                       Frequency:  Daily                             If incontinent of stool or urine, apply thin layer Barrier cream                   twice daily and PRN to wound         Pressure relief measure:  for pressure redistribution               Medications: Review discharge medications with patient and family and provide education.      Current Discharge Medication List      START taking these medications    Details   levoFLOXacin (LEVAQUIN) 750 MG tablet Take 1 tablet (750 mg total) by mouth once daily.  Qty: 30 tablet, Refills: 11         CONTINUE these medications which have NOT CHANGED    Details   apixaban (ELIQUIS) 2.5 mg Tab Take by mouth 2 (two) times daily.      ezetimibe (ZETIA) 10 mg tablet       gabapentin (NEURONTIN) 300 MG capsule       montelukast (SINGULAIR) 10 mg tablet Take 10 mg by mouth every evening.      potassium chloride SA (K-DUR,KLOR-CON) 10 MEQ tablet Take 10 mEq by mouth 2 (two) times daily.      rOPINIRole (REQUIP) 0.5 MG tablet       sildenafil, antihypertensive, (REVATIO) 20 mg Tab       simvastatin (ZOCOR) 40 MG tablet Take 40 mg by mouth every evening.      torsemide (DEMADEX) 20 MG Tab Take 20 mg by mouth once daily.      UBIQUINONE ORAL Take by mouth.       amLODIPine (NORVASC) 5 MG tablet       atenolol (TENORMIN) 25 MG tablet Take 25 mg by mouth once daily.      calcium citrate-vitamin D3 315-200 mg (CITRACAL+D) 315-200 mg-unit per tablet Take 1 tablet by mouth 2 (two) times daily.      JANUVIA 25 mg Tab       lisinopril (PRINIVIL,ZESTRIL) 40 MG tablet          STOP taking these medications       warfarin (COUMADIN) 2 MG tablet Comments:   Reason for Stopping:               I certify that this patient is confined to his home and needs intermittent skilled nursing care, physical therapy and occupational therapy.

## 2018-03-24 VITALS
SYSTOLIC BLOOD PRESSURE: 121 MMHG | HEIGHT: 67 IN | DIASTOLIC BLOOD PRESSURE: 56 MMHG | WEIGHT: 185 LBS | TEMPERATURE: 97 F | BODY MASS INDEX: 29.03 KG/M2 | OXYGEN SATURATION: 96 % | RESPIRATION RATE: 18 BRPM | HEART RATE: 71 BPM

## 2018-03-24 LAB
ALBUMIN SERPL BCP-MCNC: 2.4 G/DL
ALP SERPL-CCNC: 87 U/L
ALT SERPL W/O P-5'-P-CCNC: 10 U/L
ANION GAP SERPL CALC-SCNC: 10 MMOL/L
AST SERPL-CCNC: 12 U/L
BASOPHILS # BLD AUTO: 0.05 K/UL
BASOPHILS NFR BLD: 0.7 %
BILIRUB SERPL-MCNC: 0.5 MG/DL
BUN SERPL-MCNC: 22 MG/DL
CALCIUM SERPL-MCNC: 8.4 MG/DL
CHLORIDE SERPL-SCNC: 103 MMOL/L
CO2 SERPL-SCNC: 29 MMOL/L
CREAT SERPL-MCNC: 1.3 MG/DL
DIFFERENTIAL METHOD: ABNORMAL
EOSINOPHIL # BLD AUTO: 0.2 K/UL
EOSINOPHIL NFR BLD: 2.5 %
ERYTHROCYTE [DISTWIDTH] IN BLOOD BY AUTOMATED COUNT: 15.9 %
EST. GFR  (AFRICAN AMERICAN): 56.7 ML/MIN/1.73 M^2
EST. GFR  (NON AFRICAN AMERICAN): 49.1 ML/MIN/1.73 M^2
GLUCOSE SERPL-MCNC: 108 MG/DL
HCT VFR BLD AUTO: 25.4 %
HGB BLD-MCNC: 7.7 G/DL
IMM GRANULOCYTES # BLD AUTO: 0.03 K/UL
IMM GRANULOCYTES NFR BLD AUTO: 0.4 %
LYMPHOCYTES # BLD AUTO: 0.8 K/UL
LYMPHOCYTES NFR BLD: 10.7 %
MAGNESIUM SERPL-MCNC: 1.8 MG/DL
MCH RBC QN AUTO: 27.9 PG
MCHC RBC AUTO-ENTMCNC: 30.3 G/DL
MCV RBC AUTO: 92 FL
MONOCYTES # BLD AUTO: 0.8 K/UL
MONOCYTES NFR BLD: 11.2 %
NEUTROPHILS # BLD AUTO: 5.6 K/UL
NEUTROPHILS NFR BLD: 74.5 %
NRBC BLD-RTO: 0 /100 WBC
PHOSPHATE SERPL-MCNC: 2.8 MG/DL
PLATELET # BLD AUTO: 223 K/UL
PMV BLD AUTO: 11.5 FL
POCT GLUCOSE: 112 MG/DL (ref 70–110)
POTASSIUM SERPL-SCNC: 3.7 MMOL/L
PROT SERPL-MCNC: 6.2 G/DL
RBC # BLD AUTO: 2.76 M/UL
SODIUM SERPL-SCNC: 142 MMOL/L
WBC # BLD AUTO: 7.51 K/UL

## 2018-03-24 PROCEDURE — 25000003 PHARM REV CODE 250: Performed by: INTERNAL MEDICINE

## 2018-03-24 PROCEDURE — 25000003 PHARM REV CODE 250: Performed by: STUDENT IN AN ORGANIZED HEALTH CARE EDUCATION/TRAINING PROGRAM

## 2018-03-24 PROCEDURE — 80053 COMPREHEN METABOLIC PANEL: CPT

## 2018-03-24 PROCEDURE — 99239 HOSP IP/OBS DSCHRG MGMT >30: CPT | Mod: ,,, | Performed by: HOSPITALIST

## 2018-03-24 PROCEDURE — 84100 ASSAY OF PHOSPHORUS: CPT

## 2018-03-24 PROCEDURE — 99900035 HC TECH TIME PER 15 MIN (STAT)

## 2018-03-24 PROCEDURE — 36415 COLL VENOUS BLD VENIPUNCTURE: CPT

## 2018-03-24 PROCEDURE — 83735 ASSAY OF MAGNESIUM: CPT

## 2018-03-24 PROCEDURE — 85025 COMPLETE CBC W/AUTO DIFF WBC: CPT

## 2018-03-24 PROCEDURE — 63600175 PHARM REV CODE 636 W HCPCS: Performed by: STUDENT IN AN ORGANIZED HEALTH CARE EDUCATION/TRAINING PROGRAM

## 2018-03-24 RX ADMIN — ATENOLOL 25 MG: 25 TABLET ORAL at 08:03

## 2018-03-24 RX ADMIN — POTASSIUM CHLORIDE 10 MEQ: 750 CAPSULE, EXTENDED RELEASE ORAL at 08:03

## 2018-03-24 RX ADMIN — AMLODIPINE BESYLATE 5 MG: 5 TABLET ORAL at 08:03

## 2018-03-24 RX ADMIN — APIXABAN 2.5 MG: 2.5 TABLET, FILM COATED ORAL at 08:03

## 2018-03-24 RX ADMIN — POLYETHYLENE GLYCOL 3350 17 G: 17 POWDER, FOR SOLUTION ORAL at 08:03

## 2018-03-24 RX ADMIN — TORSEMIDE 20 MG: 20 TABLET ORAL at 08:03

## 2018-03-24 RX ADMIN — EZETIMIBE 10 MG: 10 TABLET ORAL at 08:03

## 2018-03-24 RX ADMIN — CEFTRIAXONE SODIUM 2 G: 2 INJECTION, POWDER, FOR SOLUTION INTRAMUSCULAR; INTRAVENOUS at 06:03

## 2018-03-24 NOTE — PROGRESS NOTES
"[facility] Hospital Medicine  Progress Note    Patient Name: Luis Belcher  MRN: [id number]  Patient Class: IP- Inpatient   Admission Date: 3/20/2018  Length of Stay: 4 days  Attending Physician: Kasie Osborne MD  Primary Care Provider: Laura Arrieta DO    [facility] [place] [facility]OSP MED 4 Ileana Benjamin MD    Subjective:     Principal Problem:Sepsis    HPI:  Mr. Luis Belcher is an 87 year old male with HTN, DM 2, CHF (unknown type or EF ), AAA s/p endovascular repair in 2001, atrial fibrillation, CKD stage 3 , and prostate cancer s/p radical prostatectomy 20 years ago. He is being admitted for fever.    Mr. Belcher recently moved to [place] from [place] this past December and has thus far been treated at [facility]. No records are available at this time. He  has a long history of recurrent fevers, which first occurred about 7 years ago. He has been on and off suppressive therapy with antibiotics over this course, and most recently completed a six week course of antibiotics on 12/18/17, at which time he moved from [place] to [place]. Family does not know which antibiotic he was on. Patient's wife reports he was followed by an infectious disease doctor in [place], and his infection stemmed from "a pocket of infection by his aorta", and that he was not a surgical candidate.      Most recently, Mr. Belcher was admitted to [facility] on 3/06/18 for fever, chills, n/v, weakness, and shortness of breath. The paperwork they provide is limited, but his discharge diagnoses included CHF exacerbation, elevated lactic acid, elevated troponin, and afib. He was discharged home on a course of ciprofloxacin 250mg bid and linezolid 600mg bid (uncertain length of course). Wife reports compliance with medications.     Today, Mr. Belcher presented to the ED after this afternoon, he suddenly developed a fever of 103 F, and felt weak, with chills, nausea, and two episodes of nonbloody emesis. He " also complained of right-sided temporal headache, which he described as throbbing, but has since resolved. He was treated in the ED with a 30cc/kg bolus, acetaminophen, ibuprofen, and IV vancomycin and piperacillin/tazobactam. He now feels much more comfortable and has no complaints.         Hospital Course:  Patient admitted to hospital medicine. Giving IV vanc and rocephin for aortitis and intra-abdominal abscess. Vascular and ID following.   Continuing current therapy for now. Patient would not like surgical intervention; patient with COPD and is a poor surgical candidate anyway per vascular. Patient became SOB 2/22 requiring 2L NC received IV lasix x 1 40mg with improvement. Continued on home torsemide 20mg. Patient on room air with no increased WOB on day of discharge.     Interval History:   NAEON.     Review of Systems     He denies any fevers, chills, cough, SOB, CP, N/V, diarrhea or dysuria. Has frequent urination.    Objective:     Vital Signs (Most Recent):  Temp: 96.2 °F (35.7 °C) (03/24/18 0801)  Pulse: 65 (03/24/18 0801)  Resp: 18 (03/24/18 0801)  BP: (!) 156/65 (03/24/18 0801)  SpO2: 96 % (03/24/18 0801) Vital Signs (24h Range):  Temp:  [96.2 °F (35.7 °C)-98.2 °F (36.8 °C)] 96.2 °F (35.7 °C)  Pulse:  [61-73] 65  Resp:  [15-18] 18  SpO2:  [93 %-98 %] 96 %  BP: (131-160)/(53-74) 156/65     Weight: 83.9 kg (185 lb)  Body mass index is 28.98 kg/m².    Intake/Output Summary (Last 24 hours) at 03/24/18 0811  Last data filed at 03/24/18 0500   Gross per 24 hour   Intake              450 ml   Output              875 ml   Net             -425 ml      Physical Exam   Constitutional: He is oriented to person, place, and time. He appears well-developed and well-nourished. No distress.   HENT:   Head: Normocephalic and atraumatic.   Mouth/Throat: Oropharynx is clear and moist.   Dental caries   Eyes: Conjunctivae and EOM are normal. No scleral icterus.   Neck: Normal range of motion. Neck supple. JVD present  "(improved)  Cardiovascular: Normal rate.  Exam reveals no friction rub.    No murmur heard.  Irregular rhythm   Pulmonary/Chest: Effort normal. No respiratory distress. He has no wheezes. He has rales (few bibasilar).   Abdominal: Soft. Bowel sounds are normal. He exhibits no distension. There is no tenderness. There is no guarding.   Midline hernia at , soft and easily reducible   Musculoskeletal: Normal range of motion. He exhibits no edema or tenderness.   No back pain   Neurological: He is alert and oriented to person, place, and time.   Skin: Skin is warm and dry. No rash noted. He is not diaphoretic.   Psychiatric: He has a normal mood and affect. His behavior is normal.       Significant Labs: All pertinent labs within the past 24 hours have been reviewed.    Significant Imaging: I have reviewed all pertinent imaging results/findings within the past 24 hours.    Assessment/Plan:      * Sepsis    Patient presented with fever of 103.1 F, WBC 13, respiratory rate >20, and pulse >90 on admission  Lactic acid initially 2.4, downtrended to 1.4 after 30cc/kg bolus  Given reported history of "pocket of infection near aorta", infected aortic aneurysm is top of differential at this time, CTA abdomen and pelvis done revealing aortitis and para-iliac abscess  Intially treated with IV Vancomycin/Zosyn in the ED  - follow blood cultures, NG  - Received empiric treatment with IV Vancomycin and Ceftriaxone  - Vascular surgery deems patient not a surgical candidate and patient prefers no intervention  - consult infectious disease, obtained records from [place], patient had Granulicatella adiacens bacteremia 10/30/17  and was placed on Vancomycin and Ceftriaxone x 6 weeks  -will need lifelong abx suppression; patient to be placed on 750mg Levofloxacin Qd; ID follow up with Dr. Sorto, patient's ID provider at [facility] shortly after discharge. Patient and family to arrange.          Sleep apnea    CPAP qhs: EPAP 4 per wife        " "  Anemia    Normocytic anemia, hemoglobin 9.2  Likely anemia of chronic inflammation perhaps with Fe deficient component  - B12, folate wnl          Stage 3 chronic kidney disease    Secondary to DM 2  Stage three per [facility] discharge paperwork  Cr on admission 1.7- improved since admission  Renal function may be affected by CTA, however contrast necessary to evaluate for source of infection. Patient had already received fluid bolus in the ED          Chronic atrial fibrillation    Currently rate controlled  Home med: apixaban 2.5mg bid, will continue        Chronic congestive heart failure    Grade 3 diastolic dysfuction on echo 3/21, EF 50%  Resume home torsemide 20mg for now, hemodynamically stable          Abdominal aortic aneurysm (AAA) without rupture    S/P endovascular repair in 2001  - see "sepsis"        Type 2 diabetes mellitus with renal complication    No diabetes medications on home med list, however used to take Januvia  - POCT glucose ac+hs  - SSI  - diabetic diet  - hypoglycemic protocol  - Patient to follow up with PCP 4/3/2018        Prostate cancer    History of prostate cancer s/p radical prostatectomy in 2001  However, per wife, patient has had "rising PSAs over the past few months". She reports that this was being monitored by urologist in [place]          Hypertension    -will restart amlodipine and atenolol          VTE Risk Mitigation         Ordered     apixaban tablet 2.5 mg  2 times daily     Route:  Oral        03/21/18 1534              Ileana Benjamin MD  Department of Hospital Medicine   [facility]  "

## 2018-03-24 NOTE — ASSESSMENT & PLAN NOTE
Secondary to DM 2  Stage three per [facility] discharge paperwork  Cr on admission 1.7- improved since admission  Renal function may be affected by CTA, however contrast necessary to evaluate for source of infection. Patient had already received fluid bolus in the ED

## 2018-03-24 NOTE — DISCHARGE SUMMARY
"[facility] Hospital Medicine  Discharge Summary      Patient Name: Luis Belcher  MRN: [id number]  Admission Date: 3/20/2018  Hospital Length of Stay: 4 days  Discharge Date and Time:  03/24/2018 9:26 AM  Attending Physician: Kasie Osborne MD   Discharging Provider: Ileana Benjamin MD  Primary Care Provider: Laura Arrieta DO  [facility] Medicine Team: [facility] 4 Ileana Benjamin MD    HPI:   Mr. Luis Belcher is an 87 year old male with HTN, DM 2, CHF (unknown type or EF ), AAA s/p endovascular repair in 2001, atrial fibrillation, CKD stage 3 , and prostate cancer s/p radical prostatectomy 20 years ago. He is being admitted for fever.    Mr. Belcher recently moved to [place] from [place] this past December and has thus far been treated at [facility]. No records are available at this time. He  has a long history of recurrent fevers, which first occurred about 7 years ago. He has been on and off suppressive therapy with antibiotics over this course, and most recently completed a six week course of antibiotics on 12/18/17, at which time he moved from [place] to [place]. Family does not know which antibiotic he was on. Patient's wife reports he was followed by an infectious disease doctor in [place], and his infection stemmed from "a pocket of infection by his aorta", and that he was not a surgical candidate.      Most recently, Mr. Belcher was admitted to [facility] on 3/06/18 for fever, chills, n/v, weakness, and shortness of breath. The paperwork they provide is limited, but his discharge diagnoses included CHF exacerbation, elevated lactic acid, elevated troponin, and afib. He was discharged home on a course of ciprofloxacin 250mg bid and linezolid 600mg bid (uncertain length of course). Wife reports compliance with medications.     Today, Mr. Belcher presented to the ED after this afternoon, he suddenly developed a fever of 103 F, and felt weak, with chills, nausea, and two " "episodes of nonbloody emesis. He also complained of right-sided temporal headache, which he described as throbbing, but has since resolved. He was treated in the ED with a 30cc/kg bolus, acetaminophen, ibuprofen, and IV vancomycin and piperacillin/tazobactam. He now feels much more comfortable and has no complaints.         * No surgery found *      Hospital Course:   Patient admitted to hospital medicine. Giving IV vanc and rocephin for aortitis and intra-abdominal abscess. Vascular and ID following.   Continuing current therapy for now. Patient would not like surgical intervention; patient with COPD and is a poor surgical candidate anyway per vascular. Patient became SOB 2/22 requiring 2L NC received IV lasix x 1 40mg with improvement. Continued on home torsemide 20mg. Patient on room air with no increased WOB on day of discharge.      Consults:   Consults         Status Ordering Provider     Inpatient consult to Infectious Diseases  Once     Provider:  (Not yet assigned)    Completed VI SWEET     Inpatient consult to Vascular Surgery  Once     Provider:  (Not yet assigned)    Completed VI SWEET          * Sepsis    Patient presented with fever of 103.1 F, WBC 13, respiratory rate >20, and pulse >90 on admission  Lactic acid initially 2.4, downtrended to 1.4 after 30cc/kg bolus  Given reported history of "pocket of infection near aorta", infected aortic aneurysm is top of differential at this time, CTA abdomen and pelvis done revealing aortitis and para-iliac abscess  Intially treated with IV Vancomycin/Zosyn in the ED  - follow blood cultures, NG  - Received empiric treatment with IV Vancomycin and Ceftriaxone  - Vascular surgery deems patient not a surgical candidate and patient prefers no intervention  - consult infectious disease, obtained records from [place], patient had Granulicatella adiacens bacteremia 10/30/17  and was placed on Vancomycin and Ceftriaxone x 6 weeks  -will " "need lifelong abx suppression; patient to be placed on 750mg Levofloxacin Qd; ID follow up with Dr. Sorto, patient's ID provider at [facility] shortly after discharge. Patient and family to arrange.          Sleep apnea    CPAP qhs: EPAP 4 per wife          Anemia    Normocytic anemia, hemoglobin 9.2  Likely anemia of chronic inflammation perhaps with Fe deficient component  - B12, folate wnl          Stage 3 chronic kidney disease    Secondary to DM 2  Stage three per [facility] discharge paperwork  Cr on admission 1.7- improved since admission  Renal function may be affected by CTA, however contrast necessary to evaluate for source of infection. Patient had already received fluid bolus in the ED          Chronic atrial fibrillation    Currently rate controlled  Home med: apixaban 2.5mg bid, will continue        Chronic congestive heart failure    Grade 3 diastolic dysfuction on echo 3/21, EF 50%  Resume home torsemide 20mg for now, hemodynamically stable          Abdominal aortic aneurysm (AAA) without rupture    S/P endovascular repair in 2001  - see "sepsis"        Type 2 diabetes mellitus with renal complication    No diabetes medications on home med list, however used to take Januvia  - POCT glucose ac+hs  - SSI  - diabetic diet  - hypoglycemic protocol  - Patient to follow up with PCP 4/3/2018        Prostate cancer    History of prostate cancer s/p radical prostatectomy in 2001  However, per wife, patient has had "rising PSAs over the past few months". 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]  Not Applicable    Sleep apnea [G47.30] 03/21/2018 Unknown    Hypertension [I10] 03/20/2018 Yes    Prostate cancer [C61] 03/20/2018 Yes "    Type 2 diabetes mellitus with renal complication [E11.29] 03/20/2018 Yes    Abdominal aortic aneurysm (AAA) without rupture [I71.4] 03/20/2018 Yes    Chronic congestive heart failure [I50.9] 03/20/2018 Yes    Chronic atrial fibrillation [I48.2] 03/20/2018 Yes    Stage 3 chronic kidney disease [N18.3] 03/20/2018 Yes    Anemia [D64.9] 03/20/2018 Yes      Problems Resolved During this Admission:    Diagnosis Date Noted Date Resolved POA       Discharged Condition: stable    Disposition:     Follow Up:  Follow-up Information     [facility] [place].    Specialty:  Home Health Services  Why:  Home Health  Contact information:  [address]  [place] [address]  [phone]             Laura Arrieta DO On 4/3/2018.    Specialty:  Family Medicine  Why:  Hospital Follow-up Tuesday 4/3 @ 1pm  Contact information:  [address]  [address]  [facility]  [place] [address]  [phone]                 Patient Instructions:   No discharge procedures on file.    Significant Diagnostic Studies: Labs:   CMP   Recent Labs  Lab 03/23/18  0402 03/24/18  0438    142   K 3.7 3.7    103   CO2 26 29   * 108   BUN 22 22   CREATININE 1.2 1.3   CALCIUM 8.2* 8.4*   PROT 6.4 6.2   ALBUMIN 2.5* 2.4*   BILITOT 0.6 0.5   ALKPHOS 100 87   AST 16 12   ALT 12 10   ANIONGAP 9 10   ESTGFRAFRICA >60.0 56.7*   EGFRNONAA 54.0* 49.1*    and CBC   Recent Labs  Lab 03/23/18  0402 03/24/18  0439   WBC 8.69 7.51   HGB 7.6* 7.7*   HCT 24.7* 25.4*    223       Pending Diagnostic Studies:     Procedure Component Value Units Date/Time    IR Abscess Drainage Peritoneal [[id number]]     Order Status:  Sent Lab Status:  No result          Medications:  Reconciled Home Medications:   Current Discharge Medication List      START taking these medications    Details   levoFLOXacin (LEVAQUIN) 750 MG tablet Take 1 tablet (750 mg total) by mouth once daily.  Qty: 30 tablet, Refills: 11         CONTINUE  these medications which have NOT CHANGED    Details   apixaban (ELIQUIS) 2.5 mg Tab Take by mouth 2 (two) times daily.      ezetimibe (ZETIA) 10 mg tablet       gabapentin (NEURONTIN) 300 MG capsule       montelukast (SINGULAIR) 10 mg tablet Take 10 mg by mouth every evening.      potassium chloride SA (K-DUR,KLOR-CON) 10 MEQ tablet Take 10 mEq by mouth 2 (two) times daily.      rOPINIRole (REQUIP) 0.5 MG tablet       sildenafil, antihypertensive, (REVATIO) 20 mg Tab       simvastatin (ZOCOR) 40 MG tablet Take 40 mg by mouth every evening.      torsemide (DEMADEX) 20 MG Tab Take 20 mg by mouth once daily.      UBIQUINONE ORAL Take by mouth.      amLODIPine (NORVASC) 5 MG tablet       atenolol (TENORMIN) 25 MG tablet Take 25 mg by mouth once daily.      calcium citrate-vitamin D3 315-200 mg (CITRACAL+D) 315-200 mg-unit per tablet Take 1 tablet by mouth 2 (two) times daily.      JANUVIA 25 mg Tab       lisinopril (PRINIVIL,ZESTRIL) 40 MG tablet          STOP taking these medications       warfarin (COUMADIN) 2 MG tablet Comments:   Reason for Stopping:               Indwelling Lines/Drains at time of discharge:   Lines/Drains/Airways     Pressure Ulcer                 Pressure Injury 03/23/18 1203 posterior Cheek Deep tissue injury less than 1 day                Time spent on the discharge of patient: 35 minutes  Patient was seen and examined on the date of discharge and determined to be suitable for discharge.         Ileana Benjamin MD  Department of Hospital Medicine  [facility]

## 2018-03-24 NOTE — PLAN OF CARE
CM advised by pt's nurse Radha that the pt is discharging home today with orders for HH. Per review of previous CM note pt is on service with [facility]. Contacted [facility] ([phone]) advised I needed to speak to the on call nurse to let them know this pt is discharging to day and to fax the orders. She stated she would have the on call nurse call me back.     1015- Recv'd a call back from Gina, on call nurse with [facility], she confirmed pt is on service with them, and stated I can fax the resumption orders over to [phone]. Face sheet, H&P, D/C summary, and HH orders faxed to the number above with fax confirmation e-mail received.

## 2018-03-24 NOTE — NURSING
Pt discharged home, awaiting ride. Aao. Vss. Home health service set up by [facility]. Discharge instructions given to Pt and his wife -- verbalized understanding.

## 2018-03-24 NOTE — SUBJECTIVE & OBJECTIVE
Interval History:   NAEON.     Review of Systems     He denies any fevers, chills, cough, SOB, CP, N/V, diarrhea or dysuria. Has frequent urination.    Objective:     Vital Signs (Most Recent):  Temp: 96.2 °F (35.7 °C) (03/24/18 0801)  Pulse: 65 (03/24/18 0801)  Resp: 18 (03/24/18 0801)  BP: (!) 156/65 (03/24/18 0801)  SpO2: 96 % (03/24/18 0801) Vital Signs (24h Range):  Temp:  [96.2 °F (35.7 °C)-98.2 °F (36.8 °C)] 96.2 °F (35.7 °C)  Pulse:  [61-73] 65  Resp:  [15-18] 18  SpO2:  [93 %-98 %] 96 %  BP: (131-160)/(53-74) 156/65     Weight: 83.9 kg (185 lb)  Body mass index is 28.98 kg/m².    Intake/Output Summary (Last 24 hours) at 03/24/18 0811  Last data filed at 03/24/18 0500   Gross per 24 hour   Intake              450 ml   Output              875 ml   Net             -425 ml      Physical Exam   Constitutional: He is oriented to person, place, and time. He appears well-developed and well-nourished. No distress.   HENT:   Head: Normocephalic and atraumatic.   Mouth/Throat: Oropharynx is clear and moist.   Dental caries   Eyes: Conjunctivae and EOM are normal. No scleral icterus.   Neck: Normal range of motion. Neck supple. JVD present.   Cardiovascular: Normal rate.  Exam reveals no friction rub.    No murmur heard.  Irregular rhythm   Pulmonary/Chest: Effort normal. No respiratory distress. He has no wheezes. He has rales (few bibasilar).   Abdominal: Soft. Bowel sounds are normal. He exhibits no distension. There is no tenderness. There is no guarding.   Midline hernia at , soft and easily reducible   Musculoskeletal: Normal range of motion. He exhibits no edema or tenderness.   No back pain   Neurological: He is alert and oriented to person, place, and time.   Skin: Skin is warm and dry. No rash noted. He is not diaphoretic.   Psychiatric: He has a normal mood and affect. His behavior is normal.       Significant Labs: All pertinent labs within the past 24 hours have been reviewed.    Significant Imaging: I  have reviewed all pertinent imaging results/findings within the past 24 hours.

## 2018-03-24 NOTE — ASSESSMENT & PLAN NOTE
"Patient presented with fever of 103.1 F, WBC 13, respiratory rate >20, and pulse >90 on admission  Lactic acid initially 2.4, downtrended to 1.4 after 30cc/kg bolus  Given reported history of "pocket of infection near aorta", infected aortic aneurysm is top of differential at this time, CTA abdomen and pelvis done revealing aortitis and para-iliac abscess  Intially treated with IV Vancomycin/Zosyn in the ED  - follow blood cultures, NG  - Received empiric treatment with IV Vancomycin and Ceftriaxone  - Vascular surgery deems patient not a surgical candidate and patient prefers no intervention  - consult infectious disease, obtained records from [place], patient had Granulicatella adiacens bacteremia 10/30/17 and was placed on Vancomycin and Ceftriaxone x 6 weeks  -will need lifelong abx suppression; patient to be placed on 750mg Levofloxacin Qd; ID follow up with Dr. Sorto, patient's ID provider at [facility] shortly after discharge. Patient and family to arrange.    "

## 2018-03-24 NOTE — ASSESSMENT & PLAN NOTE
No diabetes medications on home med list, however used to take Januvia  - POCT glucose ac+hs  - SSI  - diabetic diet  - hypoglycemic protocol  - Patient to follow up with PCP 4/3/2018

## 2018-03-24 NOTE — PLAN OF CARE
Problem: Patient Care Overview  Goal: Plan of Care Review  Outcome: Ongoing (interventions implemented as appropriate)  Pt AAO x 4 Pt denies pain. Pt refused C-pap machine per Resp. Therapist. Urinal at bedside. Q 2 hour monitoring for pain and safety. Will continue to monitor.

## 2018-03-24 NOTE — DISCHARGE INSTRUCTIONS
Levofloxacin 750 mg PO once daily is prescribed to you for lifelong suppression. Please take Levofloxacin 2 hours before or after mineral supplements, oral multivitamins, dairy products, or calcium-fortified juices due to food-drug interactions. Please follow up with Dr. Sorto, at [facility] shortly after discharge.

## 2018-03-25 LAB
BACTERIA BLD CULT: NORMAL
BACTERIA BLD CULT: NORMAL

## 2018-03-26 ENCOUNTER — TELEPHONE (OUTPATIENT)
Dept: INFECTIOUS DISEASES | Facility: CLINIC | Age: 83
End: 2018-03-26

## 2018-03-26 NOTE — TELEPHONE ENCOUNTER
Muriel from ID consultant in [place], [place] called and stated she was returning your call in regards to this patient and his medical records. Call back number is [phone] ext [phone].

## 2018-03-27 ENCOUNTER — PATIENT OUTREACH (OUTPATIENT)
Dept: ADMINISTRATIVE | Facility: CLINIC | Age: 83
End: 2018-03-27

## 2018-03-27 NOTE — PATIENT INSTRUCTIONS
Bacteremia, Suspected (Adult)  Your fever today is probably due to a viral illness. However, sometimes fever can be an early sign of a more serious bacterial infection. Bacteremia is a bacterial infection that has spread to the bloodstream. This is serious because it can spread to other organs, including the kidneys, brain, and lungs.  Your healthcare provider will perform tests (cultures) to check for bacteremia. Until the test results are known you should watch for the signs listed below.  Causes  Bacteremia usually starts with a typical infection, but it then spreads to the blood. Almost any type of infection can cause bacteremia, including a urinary tract infection, skin infection, gastrointestinal problem, surgical complication, or pneumonia.  Symptoms  At first symptoms may seem like any typical infection or illness, but then they worsen. Symptoms of bacteremia can include:  · Fever and chills  · Loss of appetite  · Nausea or vomiting  · Trouble breathing or fast breathing  · Fast heart rate  · Feeling lightheaded or faint  · Skin rashes or blotches  Home care  Follow these guidelines when caring for yourself at home.  · Rest at home for the first 2 to 3 days. When resuming activity, don't let yourself become overly tired.  · You can take acetaminophen or ibuprofen for pain, unless you were given a different pain medicine to use. (Note: If you have chronic liver or kidney disease or have ever had a stomach ulcer or gastrointestinal bleeding, talk with your healthcare provider before using these medicines. Also talk to your provider if you are taking medicine to prevent blood clots.) Aspirin should never be given to anyone younger than 18 years of age who is ill with a viral infection or fever. It may cause severe liver or brain damage.  · If you were given antibiotics, take them until they are used up, or your healthcare provider tells you to stop. It is important to finish the antibiotics even though you  feel better. This is to make sure the infection has cleared.  · Your appetite may be poor, so a light diet is fine. Avoid dehydration by drinking 6 to 8 glasses of fluid per day (such as water, soft drinks, sports drinks, juices, tea, or soup).  Follow-up care  Follow up with your healthcare provider, or as advised.  · If a culture was done, you will be notified if your treatment needs to be changed. You can call as directed for the results.  · If X-rays, a CT, or an ultrasound were done, a specialist will review them. You will be notified of any findings that may affect your care.  Call 911  Contact emergency services right away if any of these occur:  · Trouble breathing or swallowing, or wheezing  · Chest pain  · Confusion or sudden change in behavior  · Extreme drowsiness or trouble awakening  · Fainting or loss of consciousness  · Rapid heart rate  · Low blood pressure  · Vomiting blood, or large amounts of blood in stool  · Seizure  When to seek medical advice  Call your healthcare provider right away if any of these occur:  · Cough with lots of colored sputum (mucus), or blood in your sputum  · Severe headache  · Severe face, neck, throat, or ear pain  · Pain in the right lower abdomen  · Weakness, dizziness, repeated vomiting, or diarrhea  · Joint pain or a new rash  · Burning when urinating  · Fever of 100.4°F (38°C) or higher  Date Last Reviewed: 7/30/2016  © 2493-3345 [employer]. [address] [place] [address], [place] [address]. All rights reserved. This information is not intended as a substitute for professional medical care. Always follow your healthcare professional's instructions.

## 2018-03-27 NOTE — PROGRESS NOTES
C3 nurse attempted to contact patient. The following occurred:   C3 nurse attempted to contact Luis for a TCC post hospital discharge follow up call. The patient is unable to conduct the call @ this time. The patient requested a callback.    The patient has a scheduled [facility] appointment with Laura Arrieta DO  on 04/03/18 @ 1300. Provider is non [facility].

## 2018-03-28 NOTE — PHYSICIAN QUERY
"PT Name: Luis Belcher  MR #: [id number]     Physician Query Form - Documentation Clarification      CDS: Gaby Centeno RN, CCDS         Contact information :ext [phone])  [email or web address]       This form is a permanent document in the medical record.     Query Date: March 28, 2018    By submitting this query, we are merely seeking further clarification of documentation. Please utilize your independent clinical judgment when addressing the question(s) below.    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"    "Given reported history of "pocket of infection near aorta", infected aortic aneurysm is top of differential at this time, CTA abdomen and pelvis done revealing aortitis and para-iliac abscess  Intially treated with IV Vancomycin/Zosyn in the ED  - follow blood cultures  - continue empiric treatment with IV Vancomycin and Ceftriaxone  - Vascular surgery deems patient not a surgical candidate and patient prefers no intervention  - consult infectious disease, obtaining records from [place], will need lifelong abx suppression"   H&P 3/21/18                        Progress note 3/22/18                                                                                Doctor, Please specify diagnosis or diagnoses associated with above clinical findings.  Please clarify etiology of sepsis.  Please select all that apply.    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

## 2018-03-29 NOTE — PLAN OF CARE
Pt d/c to home w/ h/h - [facility] h/[facility].     03/29/18 1555   Final Note   Assessment Type Final Discharge Note   Discharge Disposition Home-Health   What phone number can be called within the next 1-3 days to see how you are doing after discharge? [phone]   Hospital Follow Up  Appt(s) scheduled? Yes   Right Care Referral Info   Post Acute Recommendation Home-care   Referral Type h/h   Facility Name [facility] h/[facility]

## 2018-04-05 NOTE — PHYSICIAN QUERY
"PT Name: Luis Belcher  MR #: [id number]    Physician Query Form - Heart  Condition Clarification     CDS: Gaby Centeno RN, CCDS         Contact information :ext [phone])  [email or web address]     This form is a permanent document in the medical record.     Query Date: April 5, 2018    By submitting this query, we are merely seeking further clarification of documentation. Please utilize your independent clinical judgment when addressing the question(s) below.    The medical record contains the following  ' Indicators     Supporting Clinical Findings Location in Medical Record    BNP      EF     x Radiology findings "Mildly enlarged cardiac silhouette and small left pleural effusion and/or scarring." CXR 3/20/18   x Echo Results "diastolic dysfunction" Echo 3/21/18    "Ascites" documented      "SOB" or "ALCALA" documented "c/o SOB if he lays flat on his back"  "Has a few rales"  "He has rales (few bibasilar)." Consult 3/22/18  H&P 3/21/18  Progress note 3/22/18    "Hypoxia" documented     x Heart Failure documented 'Chronic congestive heart failure     Grade 3 diastolic dysfuction on echo 3/21, EF 50%  Resume home torsemide 20mg for now, hemodynamically stable"    Discharge summary 3/24/18    "Edema" documented      Diuretics/Meds      Treatment:     x Other:  "Patient became SOB 2/22 requiring 2L NC received IV lasix x 1 40mg with improvement. Continued on home torsemide 20mg. Patient on room air with no increased WOB on day of discharge. " Progress note 3/24/18       Provider, please specify diagnosis or diagnoses associated with above clinical findings.  Please clarify CHF diagnosis,                                  [  ] Chronic Diastolic Heart Failure (EF > 40)*    [x  ] Acute on Chronic Diastolic Heart Failure( EF> 40)*     [  ] Other Type of Heart Failure (please specify type): _________________________    [  ] Other (please specify): ___________________________________    [  ] Clinically Undetermined   "          *American [employer] Association                                                                                                          Please document in your progress notes daily for the duration of treatment until resolved and include in your discharge summary.